# Patient Record
Sex: FEMALE | Race: WHITE | NOT HISPANIC OR LATINO | Employment: OTHER | RURAL
[De-identification: names, ages, dates, MRNs, and addresses within clinical notes are randomized per-mention and may not be internally consistent; named-entity substitution may affect disease eponyms.]

---

## 2020-03-21 ENCOUNTER — HISTORICAL (OUTPATIENT)
Dept: ADMINISTRATIVE | Facility: HOSPITAL | Age: 60
End: 2020-03-21

## 2020-10-19 ENCOUNTER — HISTORICAL (OUTPATIENT)
Dept: ADMINISTRATIVE | Facility: HOSPITAL | Age: 60
End: 2020-10-19

## 2020-10-20 LAB
ALBUMIN SERPL BCP-MCNC: 3.8 G/DL (ref 3.5–5)
ALBUMIN/GLOB SERPL: 0.9 {RATIO}
ALP SERPL-CCNC: 71 U/L (ref 50–130)
ALT SERPL W P-5'-P-CCNC: 20 U/L (ref 13–56)
ANION GAP SERPL CALCULATED.3IONS-SCNC: 9 MMOL/L (ref 7–16)
AST SERPL W P-5'-P-CCNC: 16 U/L (ref 15–37)
BASOPHILS # BLD AUTO: 0.03 X10E3/UL (ref 0–0.2)
BASOPHILS NFR BLD AUTO: 0.3 % (ref 0–1)
BILIRUB SERPL-MCNC: 0.3 MG/DL (ref 0–1.2)
BUN SERPL-MCNC: 12 MG/DL (ref 7–18)
BUN/CREAT SERPL: 10
CALCIUM SERPL-MCNC: 9.2 MG/DL (ref 8.5–10.1)
CHLORIDE SERPL-SCNC: 104 MMOL/L (ref 98–107)
CO2 SERPL-SCNC: 33 MMOL/L (ref 21–32)
CREAT SERPL-MCNC: 1.16 MG/DL (ref 0.5–1.02)
EOSINOPHIL # BLD AUTO: 0.36 X10E3/UL (ref 0–0.5)
EOSINOPHIL NFR BLD AUTO: 4 % (ref 1–4)
ERYTHROCYTE [DISTWIDTH] IN BLOOD BY AUTOMATED COUNT: 12.6 % (ref 11.5–14.5)
GLOBULIN SER-MCNC: 4.1 G/DL (ref 2–4)
GLUCOSE SERPL-MCNC: 73 MG/DL (ref 74–106)
HCT VFR BLD AUTO: 34.4 % (ref 38–47)
HGB BLD-MCNC: 11.2 G/DL (ref 12–16)
IMM GRANULOCYTES # BLD AUTO: 0.03 X10E3/UL (ref 0–0.04)
IMM GRANULOCYTES NFR BLD: 0.3 % (ref 0–0.4)
LYMPHOCYTES # BLD AUTO: 1.97 X10E3/UL (ref 1–4.8)
LYMPHOCYTES NFR BLD AUTO: 22 % (ref 27–41)
MCH RBC QN AUTO: 30.3 PG (ref 27–31)
MCHC RBC AUTO-ENTMCNC: 32.6 G/DL (ref 32–36)
MCV RBC AUTO: 93 FL (ref 80–96)
MONOCYTES # BLD AUTO: 0.71 X10E3/UL (ref 0–0.8)
MONOCYTES NFR BLD AUTO: 7.9 % (ref 2–6)
MPC BLD CALC-MCNC: 10.4 FL (ref 9.4–12.4)
NEUTROPHILS # BLD AUTO: 5.84 X10E3/UL (ref 1.8–7.7)
NEUTROPHILS NFR BLD AUTO: 65.5 % (ref 53–65)
NRBC # BLD AUTO: 0 X10E3/UL (ref 0–0)
NRBC, AUTO (.00): 0 /100 (ref 0–0)
PLATELET # BLD AUTO: 214 X10E3/UL (ref 150–400)
POTASSIUM SERPL-SCNC: 4.6 MMOL/L (ref 3.5–5.1)
PROT SERPL-MCNC: 7.9 G/DL (ref 6.4–8.2)
RBC # BLD AUTO: 3.7 X10E6/UL (ref 4.2–5.4)
SODIUM SERPL-SCNC: 141 MMOL/L (ref 136–145)
WBC # BLD AUTO: 8.94 X10E3/UL (ref 4.5–11)

## 2021-07-14 ENCOUNTER — OFFICE VISIT (OUTPATIENT)
Dept: FAMILY MEDICINE | Facility: CLINIC | Age: 61
End: 2021-07-14
Payer: COMMERCIAL

## 2021-07-14 VITALS
SYSTOLIC BLOOD PRESSURE: 133 MMHG | OXYGEN SATURATION: 97 % | BODY MASS INDEX: 49.13 KG/M2 | TEMPERATURE: 97 F | HEIGHT: 62 IN | DIASTOLIC BLOOD PRESSURE: 74 MMHG | HEART RATE: 73 BPM | WEIGHT: 267 LBS

## 2021-07-14 DIAGNOSIS — J06.9 UPPER RESPIRATORY TRACT INFECTION, UNSPECIFIED TYPE: Primary | ICD-10-CM

## 2021-07-14 DIAGNOSIS — I10 ESSENTIAL HYPERTENSION: ICD-10-CM

## 2021-07-14 PROCEDURE — 96372 PR INJECTION,THERAP/PROPH/DIAG2ST, IM OR SUBCUT: ICD-10-PCS | Mod: ,,, | Performed by: NURSE PRACTITIONER

## 2021-07-14 PROCEDURE — 99213 OFFICE O/P EST LOW 20 MIN: CPT | Mod: 25,,, | Performed by: NURSE PRACTITIONER

## 2021-07-14 PROCEDURE — 96372 THER/PROPH/DIAG INJ SC/IM: CPT | Mod: ,,, | Performed by: NURSE PRACTITIONER

## 2021-07-14 PROCEDURE — 99213 PR OFFICE/OUTPT VISIT, EST, LEVL III, 20-29 MIN: ICD-10-PCS | Mod: 25,,, | Performed by: NURSE PRACTITIONER

## 2021-07-14 RX ORDER — FOLIC ACID 1 MG/1
TABLET ORAL
COMMUNITY
End: 2022-09-19 | Stop reason: SDUPTHER

## 2021-07-14 RX ORDER — FUROSEMIDE 20 MG/1
TABLET ORAL DAILY
COMMUNITY
End: 2022-09-19 | Stop reason: SDUPTHER

## 2021-07-14 RX ORDER — CETIRIZINE HYDROCHLORIDE 10 MG/1
10 TABLET ORAL DAILY
Qty: 30 TABLET | Refills: 1 | Status: SHIPPED | OUTPATIENT
Start: 2021-07-14 | End: 2021-10-12 | Stop reason: SDUPTHER

## 2021-07-14 RX ORDER — ROSUVASTATIN CALCIUM 20 MG/1
1 TABLET, COATED ORAL NIGHTLY
COMMUNITY
End: 2021-10-12 | Stop reason: SDUPTHER

## 2021-07-14 RX ORDER — SULFASALAZINE 500 MG/1
2 TABLET ORAL 2 TIMES DAILY
COMMUNITY
End: 2022-03-18

## 2021-07-14 RX ORDER — DULOXETIN HYDROCHLORIDE 60 MG/1
CAPSULE, DELAYED RELEASE ORAL 2 TIMES DAILY
COMMUNITY
End: 2022-01-27

## 2021-07-14 RX ORDER — CETIRIZINE HYDROCHLORIDE 10 MG/1
TABLET ORAL DAILY
COMMUNITY
End: 2021-07-14 | Stop reason: SDUPTHER

## 2021-07-14 RX ORDER — OLMESARTAN MEDOXOMIL AND HYDROCHLOROTHIAZIDE 20/12.5 20; 12.5 MG/1; MG/1
TABLET ORAL
COMMUNITY
End: 2021-10-14

## 2021-07-14 RX ORDER — LEVOTHYROXINE SODIUM 125 UG/1
125 TABLET ORAL
COMMUNITY

## 2021-07-14 RX ORDER — FLUCONAZOLE 150 MG/1
150 TABLET ORAL DAILY
Qty: 2 TABLET | Refills: 0 | Status: SHIPPED | OUTPATIENT
Start: 2021-07-14 | End: 2021-07-16

## 2021-07-14 RX ORDER — AZITHROMYCIN 250 MG/1
TABLET, FILM COATED ORAL
Qty: 6 TABLET | Refills: 0 | Status: SHIPPED | OUTPATIENT
Start: 2021-07-14 | End: 2022-01-13 | Stop reason: SDUPTHER

## 2021-07-14 RX ORDER — PREDNISONE 5 MG/1
1 TABLET ORAL DAILY
COMMUNITY
Start: 2021-07-07 | End: 2022-03-18

## 2021-07-14 RX ORDER — HYDROXYCHLOROQUINE SULFATE 200 MG/1
TABLET, FILM COATED ORAL 2 TIMES DAILY
COMMUNITY
End: 2022-09-19

## 2021-07-14 RX ORDER — OXYBUTYNIN CHLORIDE 10 MG/1
1 TABLET, EXTENDED RELEASE ORAL DAILY
COMMUNITY
Start: 2021-06-08 | End: 2021-10-12 | Stop reason: SDUPTHER

## 2021-07-14 RX ORDER — CEFTRIAXONE 1 G/1
1 INJECTION, POWDER, FOR SOLUTION INTRAMUSCULAR; INTRAVENOUS
Status: COMPLETED | OUTPATIENT
Start: 2021-07-14 | End: 2021-07-14

## 2021-07-14 RX ORDER — IBUPROFEN AND FAMOTIDINE 800; 26.6 MG/1; MG/1
1 TABLET, COATED ORAL 3 TIMES DAILY PRN
COMMUNITY
Start: 2021-04-22 | End: 2022-10-31

## 2021-07-14 RX ORDER — METOPROLOL SUCCINATE 25 MG/1
TABLET, EXTENDED RELEASE ORAL
COMMUNITY
End: 2021-10-12 | Stop reason: SDUPTHER

## 2021-07-14 RX ORDER — METHOTREXATE 2.5 MG/1
TABLET ORAL
COMMUNITY

## 2021-07-14 RX ORDER — NAPROXEN SODIUM 220 MG/1
TABLET, FILM COATED ORAL
COMMUNITY

## 2021-07-14 RX ORDER — MONTELUKAST SODIUM 10 MG/1
10 TABLET ORAL NIGHTLY
Qty: 30 TABLET | Refills: 0 | Status: SHIPPED | OUTPATIENT
Start: 2021-07-14 | End: 2021-08-13

## 2021-07-14 RX ADMIN — CEFTRIAXONE 1 G: 1 INJECTION, POWDER, FOR SOLUTION INTRAMUSCULAR; INTRAVENOUS at 11:07

## 2021-10-12 ENCOUNTER — OFFICE VISIT (OUTPATIENT)
Dept: FAMILY MEDICINE | Facility: CLINIC | Age: 61
End: 2021-10-12
Payer: COMMERCIAL

## 2021-10-12 VITALS
HEART RATE: 68 BPM | SYSTOLIC BLOOD PRESSURE: 122 MMHG | HEIGHT: 62 IN | BODY MASS INDEX: 42.76 KG/M2 | TEMPERATURE: 98 F | DIASTOLIC BLOOD PRESSURE: 79 MMHG | WEIGHT: 232.38 LBS

## 2021-10-12 DIAGNOSIS — I10 HYPERTENSION, UNSPECIFIED TYPE: ICD-10-CM

## 2021-10-12 DIAGNOSIS — R11.0 NAUSEA: Primary | ICD-10-CM

## 2021-10-12 DIAGNOSIS — E03.9 HYPOTHYROIDISM, UNSPECIFIED TYPE: ICD-10-CM

## 2021-10-12 LAB
ALBUMIN SERPL BCP-MCNC: 3.7 G/DL (ref 3.5–5)
ALBUMIN/GLOB SERPL: 1 {RATIO}
ALP SERPL-CCNC: 69 U/L (ref 50–130)
ALT SERPL W P-5'-P-CCNC: 39 U/L (ref 13–56)
ANION GAP SERPL CALCULATED.3IONS-SCNC: 10 MMOL/L (ref 7–16)
AST SERPL W P-5'-P-CCNC: 19 U/L (ref 15–37)
BASOPHILS # BLD AUTO: 0.02 K/UL (ref 0–0.2)
BASOPHILS NFR BLD AUTO: 0.3 % (ref 0–1)
BILIRUB SERPL-MCNC: 0.4 MG/DL (ref 0–1.2)
BILIRUB UR QL STRIP: ABNORMAL
BUN SERPL-MCNC: 6 MG/DL (ref 7–18)
BUN/CREAT SERPL: 6 (ref 6–20)
CALCIUM SERPL-MCNC: 9.6 MG/DL (ref 8.5–10.1)
CHLORIDE SERPL-SCNC: 104 MMOL/L (ref 98–107)
CHOLEST SERPL-MCNC: 177 MG/DL (ref 0–200)
CHOLEST/HDLC SERPL: 3.7 {RATIO}
CLARITY UR: ABNORMAL
CO2 SERPL-SCNC: 29 MMOL/L (ref 21–32)
COLOR UR: YELLOW
CREAT SERPL-MCNC: 1.03 MG/DL (ref 0.55–1.02)
DIFFERENTIAL METHOD BLD: ABNORMAL
EOSINOPHIL # BLD AUTO: 0.15 K/UL (ref 0–0.5)
EOSINOPHIL NFR BLD AUTO: 2 % (ref 1–4)
ERYTHROCYTE [DISTWIDTH] IN BLOOD BY AUTOMATED COUNT: 13.7 % (ref 11.5–14.5)
GLOBULIN SER-MCNC: 3.8 G/DL (ref 2–4)
GLUCOSE SERPL-MCNC: 79 MG/DL (ref 74–106)
GLUCOSE UR STRIP-MCNC: NEGATIVE MG/DL
HCT VFR BLD AUTO: 36.2 % (ref 38–47)
HDLC SERPL-MCNC: 48 MG/DL (ref 40–60)
HGB BLD-MCNC: 12.1 G/DL (ref 12–16)
IMM GRANULOCYTES # BLD AUTO: 0.02 K/UL (ref 0–0.04)
IMM GRANULOCYTES NFR BLD: 0.3 % (ref 0–0.4)
KETONES UR STRIP-SCNC: ABNORMAL MG/DL
LDLC SERPL CALC-MCNC: 105 MG/DL
LDLC/HDLC SERPL: 2.2 {RATIO}
LEUKOCYTE ESTERASE UR QL STRIP: NEGATIVE
LYMPHOCYTES # BLD AUTO: 1.35 K/UL (ref 1–4.8)
LYMPHOCYTES NFR BLD AUTO: 18 % (ref 27–41)
MCH RBC QN AUTO: 31.9 PG (ref 27–31)
MCHC RBC AUTO-ENTMCNC: 33.4 G/DL (ref 32–36)
MCV RBC AUTO: 95.5 FL (ref 80–96)
MONOCYTES # BLD AUTO: 0.57 K/UL (ref 0–0.8)
MONOCYTES NFR BLD AUTO: 7.6 % (ref 2–6)
MPC BLD CALC-MCNC: 10.6 FL (ref 9.4–12.4)
NEUTROPHILS # BLD AUTO: 5.37 K/UL (ref 1.8–7.7)
NEUTROPHILS NFR BLD AUTO: 71.8 % (ref 53–65)
NITRITE UR QL STRIP: NEGATIVE
NONHDLC SERPL-MCNC: 129 MG/DL
NRBC # BLD AUTO: 0 X10E3/UL
NRBC, AUTO (.00): 0 %
PH UR STRIP: 6 PH UNITS
PLATELET # BLD AUTO: 255 K/UL (ref 150–400)
POTASSIUM SERPL-SCNC: 3.7 MMOL/L (ref 3.5–5.1)
PROT SERPL-MCNC: 7.5 G/DL (ref 6.4–8.2)
PROT UR QL STRIP: ABNORMAL
RBC # BLD AUTO: 3.79 M/UL (ref 4.2–5.4)
RBC # UR STRIP: NEGATIVE /UL
SODIUM SERPL-SCNC: 139 MMOL/L (ref 136–145)
SP GR UR STRIP: >=1.03
T4 SERPL-MCNC: 8.9 ΜG/DL (ref 4.8–13.9)
TRIGL SERPL-MCNC: 122 MG/DL (ref 35–150)
TSH SERPL DL<=0.005 MIU/L-ACNC: 3.31 UIU/ML (ref 0.36–3.74)
UROBILINOGEN UR STRIP-ACNC: 0.2 MG/DL
VLDLC SERPL-MCNC: 24 MG/DL
WBC # BLD AUTO: 7.48 K/UL (ref 4.5–11)

## 2021-10-12 PROCEDURE — 81003 URINALYSIS AUTO W/O SCOPE: CPT | Mod: QW,,, | Performed by: CLINICAL MEDICAL LABORATORY

## 2021-10-12 PROCEDURE — 80050 PR  GENERAL HEALTH PANEL: ICD-10-PCS | Mod: ,,, | Performed by: CLINICAL MEDICAL LABORATORY

## 2021-10-12 PROCEDURE — 99213 OFFICE O/P EST LOW 20 MIN: CPT | Mod: ,,, | Performed by: NURSE PRACTITIONER

## 2021-10-12 PROCEDURE — 80061 LIPID PANEL: ICD-10-PCS | Mod: ,,, | Performed by: CLINICAL MEDICAL LABORATORY

## 2021-10-12 PROCEDURE — 84436 T4: ICD-10-PCS | Mod: ,,, | Performed by: CLINICAL MEDICAL LABORATORY

## 2021-10-12 PROCEDURE — 80050 GENERAL HEALTH PANEL: CPT | Mod: ,,, | Performed by: CLINICAL MEDICAL LABORATORY

## 2021-10-12 PROCEDURE — 81003 URINALYSIS: ICD-10-PCS | Mod: QW,,, | Performed by: CLINICAL MEDICAL LABORATORY

## 2021-10-12 PROCEDURE — 80061 LIPID PANEL: CPT | Mod: ,,, | Performed by: CLINICAL MEDICAL LABORATORY

## 2021-10-12 PROCEDURE — 84436 ASSAY OF TOTAL THYROXINE: CPT | Mod: ,,, | Performed by: CLINICAL MEDICAL LABORATORY

## 2021-10-12 PROCEDURE — 99213 PR OFFICE/OUTPT VISIT, EST, LEVL III, 20-29 MIN: ICD-10-PCS | Mod: ,,, | Performed by: NURSE PRACTITIONER

## 2021-10-12 RX ORDER — CETIRIZINE HYDROCHLORIDE 10 MG/1
10 TABLET ORAL DAILY
Qty: 30 TABLET | Refills: 1 | Status: SHIPPED | OUTPATIENT
Start: 2021-10-12 | End: 2022-01-13 | Stop reason: SDUPTHER

## 2021-10-12 RX ORDER — METOPROLOL TARTRATE 25 MG/1
TABLET, FILM COATED ORAL
Qty: 180 TABLET | Refills: 1 | Status: SHIPPED | OUTPATIENT
Start: 2021-10-12 | End: 2022-03-30 | Stop reason: SDUPTHER

## 2021-10-12 RX ORDER — ROSUVASTATIN CALCIUM 20 MG/1
20 TABLET, COATED ORAL NIGHTLY
Qty: 90 TABLET | Refills: 1 | Status: SHIPPED | OUTPATIENT
Start: 2021-10-12 | End: 2022-03-30 | Stop reason: SDUPTHER

## 2021-10-12 RX ORDER — PANTOPRAZOLE SODIUM 40 MG/1
40 TABLET, DELAYED RELEASE ORAL DAILY
Qty: 30 TABLET | Refills: 11 | Status: SHIPPED | OUTPATIENT
Start: 2021-10-12 | End: 2022-09-19

## 2021-10-12 RX ORDER — OXYBUTYNIN CHLORIDE 10 MG/1
10 TABLET, EXTENDED RELEASE ORAL DAILY
Qty: 90 TABLET | Refills: 1 | Status: SHIPPED | OUTPATIENT
Start: 2021-10-12 | End: 2022-09-19 | Stop reason: SDUPTHER

## 2021-10-26 DIAGNOSIS — N39.0 URINARY TRACT INFECTION WITHOUT HEMATURIA, SITE UNSPECIFIED: Primary | ICD-10-CM

## 2021-10-28 RX ORDER — CIPROFLOXACIN 500 MG/1
500 TABLET ORAL EVERY 12 HOURS
Qty: 14 TABLET | Refills: 0 | Status: SHIPPED | OUTPATIENT
Start: 2021-10-28 | End: 2021-11-04

## 2022-01-13 ENCOUNTER — OFFICE VISIT (OUTPATIENT)
Dept: FAMILY MEDICINE | Facility: CLINIC | Age: 62
End: 2022-01-13
Payer: COMMERCIAL

## 2022-01-13 VITALS
OXYGEN SATURATION: 97 % | RESPIRATION RATE: 18 BRPM | DIASTOLIC BLOOD PRESSURE: 68 MMHG | HEIGHT: 62 IN | TEMPERATURE: 97 F | BODY MASS INDEX: 38.83 KG/M2 | SYSTOLIC BLOOD PRESSURE: 98 MMHG | WEIGHT: 211 LBS | HEART RATE: 78 BPM

## 2022-01-13 DIAGNOSIS — J06.9 ACUTE UPPER RESPIRATORY INFECTION: ICD-10-CM

## 2022-01-13 DIAGNOSIS — J32.9 SINUSITIS, UNSPECIFIED CHRONICITY, UNSPECIFIED LOCATION: Primary | ICD-10-CM

## 2022-01-13 DIAGNOSIS — I10 PRIMARY HYPERTENSION: ICD-10-CM

## 2022-01-13 LAB
CTP QC/QA: YES
CTP QC/QA: YES
FLUAV AG NPH QL: NEGATIVE
FLUBV AG NPH QL: NEGATIVE
SARS-COV-2 AG RESP QL IA.RAPID: NEGATIVE

## 2022-01-13 PROCEDURE — 3074F PR MOST RECENT SYSTOLIC BLOOD PRESSURE < 130 MM HG: ICD-10-PCS | Mod: ,,, | Performed by: NURSE PRACTITIONER

## 2022-01-13 PROCEDURE — 87804 POCT INFLUENZA A/B: ICD-10-PCS | Mod: 59,QW,, | Performed by: NURSE PRACTITIONER

## 2022-01-13 PROCEDURE — 1159F MED LIST DOCD IN RCRD: CPT | Mod: ,,, | Performed by: NURSE PRACTITIONER

## 2022-01-13 PROCEDURE — 3078F PR MOST RECENT DIASTOLIC BLOOD PRESSURE < 80 MM HG: ICD-10-PCS | Mod: ,,, | Performed by: NURSE PRACTITIONER

## 2022-01-13 PROCEDURE — 3008F BODY MASS INDEX DOCD: CPT | Mod: ,,, | Performed by: NURSE PRACTITIONER

## 2022-01-13 PROCEDURE — 99213 OFFICE O/P EST LOW 20 MIN: CPT | Mod: ,,, | Performed by: NURSE PRACTITIONER

## 2022-01-13 PROCEDURE — 99213 PR OFFICE/OUTPT VISIT, EST, LEVL III, 20-29 MIN: ICD-10-PCS | Mod: ,,, | Performed by: NURSE PRACTITIONER

## 2022-01-13 PROCEDURE — 3074F SYST BP LT 130 MM HG: CPT | Mod: ,,, | Performed by: NURSE PRACTITIONER

## 2022-01-13 PROCEDURE — 3078F DIAST BP <80 MM HG: CPT | Mod: ,,, | Performed by: NURSE PRACTITIONER

## 2022-01-13 PROCEDURE — 1159F PR MEDICATION LIST DOCUMENTED IN MEDICAL RECORD: ICD-10-PCS | Mod: ,,, | Performed by: NURSE PRACTITIONER

## 2022-01-13 PROCEDURE — 3008F PR BODY MASS INDEX (BMI) DOCUMENTED: ICD-10-PCS | Mod: ,,, | Performed by: NURSE PRACTITIONER

## 2022-01-13 PROCEDURE — 87804 INFLUENZA ASSAY W/OPTIC: CPT | Mod: QW,,, | Performed by: NURSE PRACTITIONER

## 2022-01-13 PROCEDURE — 87426 SARS CORONAVIRUS 2 ANTIGEN POCT: ICD-10-PCS | Mod: QW,,, | Performed by: NURSE PRACTITIONER

## 2022-01-13 PROCEDURE — 87426 SARSCOV CORONAVIRUS AG IA: CPT | Mod: QW,,, | Performed by: NURSE PRACTITIONER

## 2022-01-13 RX ORDER — AZITHROMYCIN 250 MG/1
TABLET, FILM COATED ORAL
Qty: 6 TABLET | Refills: 0 | Status: SHIPPED | OUTPATIENT
Start: 2022-01-13 | End: 2022-01-27

## 2022-01-13 RX ORDER — CETIRIZINE HYDROCHLORIDE 10 MG/1
10 TABLET ORAL DAILY
Qty: 30 TABLET | Refills: 1 | Status: SHIPPED | OUTPATIENT
Start: 2022-01-13 | End: 2022-09-19

## 2022-01-13 NOTE — PROGRESS NOTES
New Clinic Note    Marielle Durham is a 61 y.o. female presents to the clinic with c/o coughing and congestion, started two days ago.  No fever    Chief complaints    Chief Complaint   Patient presents with    Cough    Sinus Problem    Sinusitis    congestion    Nasal Congestion        Subjective:    HPI       Current Outpatient Medications:     aspirin 81 MG Chew, 1 tablet, Disp: , Rfl:     azithromycin (ZITHROMAX Z-MALLORY) 250 MG tablet, As directed, Disp: 6 tablet, Rfl: 0    cetirizine (ZYRTEC) 10 MG tablet, Take 1 tablet (10 mg total) by mouth once daily., Disp: 30 tablet, Rfl: 1    DUEXIS 800-26.6 mg Tab, Take 1 tablet by mouth 3 (three) times daily as needed., Disp: , Rfl:     DULoxetine (CYMBALTA) 60 MG capsule, TAKE ONE (1) CAPSULE BY MOUTH TWICE DAILY, Disp: 60 capsule, Rfl: 5    DULoxetine (CYMBALTA) 60 MG capsule, 2 (two) times daily., Disp: , Rfl:     folic acid (FOLVITE) 1 MG tablet, 1 tablet, Disp: , Rfl:     furosemide (LASIX) 20 MG tablet, once daily., Disp: , Rfl:     hydrOXYchloroQUINE (PLAQUENIL) 200 mg tablet, 2 (two) times daily., Disp: , Rfl:     levothyroxine (SYNTHROID) 112 MCG tablet, 1 tablet, Disp: , Rfl:     methotrexate 2.5 MG Tab, 8 tablets, Disp: , Rfl:     metoprolol tartrate (LOPRESSOR) 25 MG tablet, TAKE ONE (1) TABLET BY MOUTH EACH MORNING AND EACH EVENING, Disp: 180 tablet, Rfl: 1    olmesartan-hydrochlorothiazide (BENICAR HCT) 20-12.5 mg per tablet, TAKE ONE TABLET BY MOUTH DAILY FOR BLOOD PRESSURE, Disp: 90 tablet, Rfl: 0    oxybutynin (DITROPAN-XL) 10 MG 24 hr tablet, Take 1 tablet (10 mg total) by mouth once daily., Disp: 90 tablet, Rfl: 1    pantoprazole (PROTONIX) 40 MG tablet, Take 1 tablet (40 mg total) by mouth once daily., Disp: 30 tablet, Rfl: 11    predniSONE (DELTASONE) 5 MG tablet, Take 1 tablet by mouth once daily., Disp: , Rfl:     rosuvastatin (CRESTOR) 20 MG tablet, Take 1 tablet (20 mg total) by mouth every evening., Disp: 90 tablet, Rfl:  "1    sulfaSALAzine (AZULFIDINE) 500 mg Tab, Take 2 tablets by mouth 2 (two) times daily., Disp: , Rfl:      Past Surgical History:   Procedure Laterality Date     SECTION      CHOLECYSTECTOMY      HYSTERECTOMY      TUBAL LIGATION          Social History     Socioeconomic History    Marital status:      Spouse name: esteban    Number of children: 2   Occupational History    Occupation: retired   Tobacco Use    Smoking status: Never Smoker    Smokeless tobacco: Never Used   Substance and Sexual Activity    Alcohol use: Never    Drug use: Never    Sexual activity: Yes     Partners: Male     Birth control/protection: None        Review of Systems   Constitutional: Negative.  Negative for fatigue and fever.   HENT: Positive for nasal congestion, rhinorrhea, sinus pressure/congestion and sneezing.    Respiratory: Positive for cough.    Cardiovascular: Negative.    Gastrointestinal: Negative.    Endocrine: Negative.    Musculoskeletal: Negative.    Neurological: Negative.    Psychiatric/Behavioral: Negative.    All other systems reviewed and are negative.       Objective:  BP 98/68   Pulse 78   Temp 97.1 °F (36.2 °C)   Resp 18   Ht 5' 2" (1.575 m)   Wt 95.7 kg (211 lb)   SpO2 97%   BMI 38.59 kg/m²      Physical Exam  Constitutional:       Appearance: Normal appearance.   HENT:      Right Ear: Tympanic membrane and ear canal normal.      Left Ear: Tympanic membrane and ear canal normal.      Nose: Congestion and rhinorrhea present.      Mouth/Throat:      Mouth: Mucous membranes are moist.   Eyes:      Pupils: Pupils are equal, round, and reactive to light.   Cardiovascular:      Rate and Rhythm: Normal rate and regular rhythm.      Pulses: Normal pulses.      Heart sounds: Normal heart sounds.   Pulmonary:      Effort: Pulmonary effort is normal.      Breath sounds: Normal breath sounds.   Musculoskeletal:         General: Normal range of motion.      Cervical back: Normal range of motion. "   Skin:     General: Skin is warm and dry.   Neurological:      General: No focal deficit present.      Mental Status: She is alert and oriented to person, place, and time.   Psychiatric:         Mood and Affect: Mood normal.         Behavior: Behavior normal.          Assessment and Plan:  1. Sinusitis, unspecified chronicity, unspecified location    2. Acute upper respiratory infection    3. Primary hypertension         Sinusitis, unspecified chronicity, unspecified location  -     azithromycin (ZITHROMAX Z-MALLORY) 250 MG tablet; As directed  Dispense: 6 tablet; Refill: 0  -     cetirizine (ZYRTEC) 10 MG tablet; Take 1 tablet (10 mg total) by mouth once daily.  Dispense: 30 tablet; Refill: 1    Acute upper respiratory infection  -     POCT Influenza A/B  -     SARS Coronavirus 2 Antigen, POCT  -     azithromycin (ZITHROMAX Z-MALLORY) 250 MG tablet; As directed  Dispense: 6 tablet; Refill: 0  -     cetirizine (ZYRTEC) 10 MG tablet; Take 1 tablet (10 mg total) by mouth once daily.  Dispense: 30 tablet; Refill: 1    Primary hypertension         Active Problem List with Overview Notes    Diagnosis Date Noted    Hypertension     Thyroid disease     Hyperlipidemia     Arthritis         Follow up in about 2 weeks (around 1/27/2022) for discuss bp with Luis.       Patient Instructions   1. Drink fluids  2. Tylenol as needs  3. Follow up in two weeks with Luis to eval bp.  She has lost 50 pounds with dieting and meds and may need adjustment of bp meds.

## 2022-01-21 ENCOUNTER — TELEPHONE (OUTPATIENT)
Dept: FAMILY MEDICINE | Facility: CLINIC | Age: 62
End: 2022-01-21
Payer: COMMERCIAL

## 2022-01-21 DIAGNOSIS — K29.00 ACUTE GASTRITIS WITHOUT HEMORRHAGE, UNSPECIFIED GASTRITIS TYPE: Primary | ICD-10-CM

## 2022-01-21 PROBLEM — M54.16 LUMBAR RADICULOPATHY: Status: ACTIVE | Noted: 2022-01-21

## 2022-01-21 PROBLEM — M43.10 ACQUIRED SPONDYLOLISTHESIS: Status: ACTIVE | Noted: 2022-01-21

## 2022-01-21 PROBLEM — E03.9 ACQUIRED HYPOTHYROIDISM: Status: ACTIVE | Noted: 2022-01-21

## 2022-01-21 PROBLEM — D64.9 ANEMIA: Status: ACTIVE | Noted: 2022-01-21

## 2022-01-21 PROBLEM — M79.7 FIBROMYALGIA: Status: ACTIVE | Noted: 2022-01-21

## 2022-01-21 PROBLEM — M15.9 GENERALIZED OSTEOARTHRITIS: Status: ACTIVE | Noted: 2022-01-21

## 2022-01-21 RX ORDER — ONDANSETRON 4 MG/1
4 TABLET, FILM COATED ORAL EVERY 8 HOURS PRN
Qty: 15 TABLET | Refills: 0 | Status: SHIPPED | OUTPATIENT
Start: 2022-01-21 | End: 2022-01-24

## 2022-01-21 NOTE — TELEPHONE ENCOUNTER
Patient called reporting she has had nausea and vomiting for the past 1-2 days, symptoms have gotten worse today. She states it could be a virus or it could be a diet plan she has been following. She is requesting zofran to help with the vomiting. Instructed patient to go to a clear liquid diet for the remainder of today, slowly progress to full liquids tomorrow morning and progress as follows. Avoid all spicy and fried foods for the next 1-2 days keeping diet bland. If the vomiting continues or if you begin having severe abdominal pain, go to the ER in [place] for evaluation by a GI doctor. Patient verbalized an understanding.

## 2022-01-27 ENCOUNTER — OFFICE VISIT (OUTPATIENT)
Dept: FAMILY MEDICINE | Facility: CLINIC | Age: 62
End: 2022-01-27
Payer: COMMERCIAL

## 2022-01-27 VITALS
DIASTOLIC BLOOD PRESSURE: 81 MMHG | SYSTOLIC BLOOD PRESSURE: 124 MMHG | OXYGEN SATURATION: 100 % | BODY MASS INDEX: 39.78 KG/M2 | HEIGHT: 62 IN | HEART RATE: 69 BPM | WEIGHT: 216.19 LBS

## 2022-01-27 DIAGNOSIS — I10 PRIMARY HYPERTENSION: Primary | ICD-10-CM

## 2022-01-27 PROBLEM — M94.0 TIETZE'S DISEASE: Status: ACTIVE | Noted: 2022-01-27

## 2022-01-27 PROCEDURE — 3079F DIAST BP 80-89 MM HG: CPT | Mod: ,,, | Performed by: REGISTERED NURSE

## 2022-01-27 PROCEDURE — 1159F PR MEDICATION LIST DOCUMENTED IN MEDICAL RECORD: ICD-10-PCS | Mod: ,,, | Performed by: REGISTERED NURSE

## 2022-01-27 PROCEDURE — 1160F RVW MEDS BY RX/DR IN RCRD: CPT | Mod: ,,, | Performed by: REGISTERED NURSE

## 2022-01-27 PROCEDURE — 3008F PR BODY MASS INDEX (BMI) DOCUMENTED: ICD-10-PCS | Mod: ,,, | Performed by: REGISTERED NURSE

## 2022-01-27 PROCEDURE — 3079F PR MOST RECENT DIASTOLIC BLOOD PRESSURE 80-89 MM HG: ICD-10-PCS | Mod: ,,, | Performed by: REGISTERED NURSE

## 2022-01-27 PROCEDURE — 3074F PR MOST RECENT SYSTOLIC BLOOD PRESSURE < 130 MM HG: ICD-10-PCS | Mod: ,,, | Performed by: REGISTERED NURSE

## 2022-01-27 PROCEDURE — 99213 OFFICE O/P EST LOW 20 MIN: CPT | Mod: ,,, | Performed by: REGISTERED NURSE

## 2022-01-27 PROCEDURE — 99213 PR OFFICE/OUTPT VISIT, EST, LEVL III, 20-29 MIN: ICD-10-PCS | Mod: ,,, | Performed by: REGISTERED NURSE

## 2022-01-27 PROCEDURE — 1160F PR REVIEW ALL MEDS BY PRESCRIBER/CLIN PHARMACIST DOCUMENTED: ICD-10-PCS | Mod: ,,, | Performed by: REGISTERED NURSE

## 2022-01-27 PROCEDURE — 3008F BODY MASS INDEX DOCD: CPT | Mod: ,,, | Performed by: REGISTERED NURSE

## 2022-01-27 PROCEDURE — 3074F SYST BP LT 130 MM HG: CPT | Mod: ,,, | Performed by: REGISTERED NURSE

## 2022-01-27 PROCEDURE — 1159F MED LIST DOCD IN RCRD: CPT | Mod: ,,, | Performed by: REGISTERED NURSE

## 2022-01-27 RX ORDER — METOPROLOL SUCCINATE 25 MG/1
TABLET, EXTENDED RELEASE ORAL
COMMUNITY
End: 2022-09-19 | Stop reason: SDUPTHER

## 2022-01-27 NOTE — PROGRESS NOTES
ROD Perez        PATIENT NAME: Marielle Durham  : 1960  DATE: 22  MRN: [id number]      Billing Provider: ROD Perez  Level of Service:   Patient PCP Information     Provider PCP Type    ROD Perez General          Reason for Visit / Chief Complaint: Follow-up and Hypertension       Update PCP  Update Chief Complaint         History of Present Illness / Problem Focused Workflow     Marielle Durham presents to the clinic with Follow-up and Hypertension     HPI  60 y/o WF had a wellness done with Dr. Cote on 2022. She had a mammogram and lab work. Patients blood pressure has been running 112/78, 120/80, 132/78. Blood pressure improved from last encounter.     Review of Systems     Review of Systems   Constitutional: Negative.    HENT: Negative.    Respiratory: Negative.    Cardiovascular: Negative.    Musculoskeletal: Positive for back pain.   Integumentary:  Negative.   Neurological: Negative.    Psychiatric/Behavioral: Negative.         Medical / Social / Family History     Past Medical History:   Diagnosis Date    Allergy     Arthritis     Hyperlipidemia     Hypertension     Thyroid disease        Past Surgical History:   Procedure Laterality Date     SECTION      CHOLECYSTECTOMY      HYSTERECTOMY      TUBAL LIGATION         Social History  Ms. Marielle Durham  reports that she has never smoked. She has never used smokeless tobacco. She reports that she does not drink alcohol and does not use drugs.    Family History  Ms. Marielle Durham's family history is not on file.    Medications and Allergies     Medications  Outpatient Medications Marked as Taking for the 22 encounter (Office Visit) with ROD Perez   Medication Sig Dispense Refill    aspirin 81 MG Chew 1 tablet      cetirizine (ZYRTEC) 10 MG tablet Take 1 tablet (10 mg total) by mouth once daily. 30 tablet 1    DUEXIS 800-26.6 mg Tab Take 1 tablet by  mouth 3 (three) times daily as needed.      DULoxetine (CYMBALTA) 60 MG capsule TAKE ONE (1) CAPSULE BY MOUTH TWICE DAILY 60 capsule 5    folic acid (FOLVITE) 1 MG tablet 1 tablet      furosemide (LASIX) 20 MG tablet once daily.      hydrOXYchloroQUINE (PLAQUENIL) 200 mg tablet 2 (two) times daily.      levothyroxine (SYNTHROID) 112 MCG tablet 1 tablet      methotrexate 2.5 MG Tab 8 tablets      metoprolol succinate (TOPROL-XL) 25 MG 24 hr tablet 1 tablet      metoprolol tartrate (LOPRESSOR) 25 MG tablet TAKE ONE (1) TABLET BY MOUTH EACH MORNING AND EACH EVENING 180 tablet 1    olmesartan-hydrochlorothiazide (BENICAR HCT) 20-12.5 mg per tablet TAKE ONE TABLET BY MOUTH DAILY FOR BLOOD PRESSURE 90 tablet 0    pantoprazole (PROTONIX) 40 MG tablet Take 1 tablet (40 mg total) by mouth once daily. 30 tablet 11    predniSONE (DELTASONE) 5 MG tablet Take 1 tablet by mouth once daily.      sulfaSALAzine (AZULFIDINE) 500 mg Tab Take 2 tablets by mouth 2 (two) times daily.         Allergies  Review of patient's allergies indicates:   Allergen Reactions    Pentazocine lactate Rash       Physical Examination     Vitals:    01/27/22 1408   BP: 124/81   Pulse: 69     Physical Exam  Vitals and nursing note reviewed.   Constitutional:       Appearance: She is obese.      Comments: Patient is working on weight loss   HENT:      Head: Normocephalic and atraumatic.   Cardiovascular:      Rate and Rhythm: Normal rate and regular rhythm.      Heart sounds: Normal heart sounds.   Pulmonary:      Effort: Pulmonary effort is normal.      Breath sounds: Normal breath sounds.   Musculoskeletal:         General: Normal range of motion.      Cervical back: Normal range of motion.   Skin:     General: Skin is warm and dry.   Neurological:      Mental Status: She is alert and oriented to person, place, and time.   Psychiatric:         Mood and Affect: Mood normal.         Behavior: Behavior normal.          Assessment and Plan  (including Health Maintenance)      Problem List  Smart Sets  Document Outside HM   :    Plan:   There are no diagnoses linked to this encounter.       Health Maintenance Due   Topic Date Due    Hepatitis C Screening  Never done    COVID-19 Vaccine (1) Never done    HIV Screening  Never done    TETANUS VACCINE  Never done    Mammogram  Never done    Colorectal Cancer Screening  Never done    Shingles Vaccine (1 of 2) Never done    Influenza Vaccine (1) Never done       Problem List Items Addressed This Visit    None         Health Maintenance Topics with due status: Not Due       Topic Last Completion Date    Lipid Panel 10/12/2021       No future appointments.     Patient Instructions   Patient will continue to monitor blood pressure and will keep a bp log. If blood pressure is consistently higher than 140/80, she will return to clinic for evaluation.     Patient to continue her weight loss efforts as this will help improve her htn management.    Patient Education       DASH Diet   About this topic   DASH stands for Dietary Approaches to Stop Hypertension. The DASH diet may help you lower blood pressure. It may also help keep you from getting high blood pressure. You will eat less fat and more fiber on the DASH diet.  This diet gives you more minerals that fight high blood pressure. Some nutrients in this diet are:  · Potassium ? Acts to help you get rid of salt. This may help to lower blood pressure.  · Calcium ? Makes blood vessels and muscles work the right way  · Magnesium - Helps blood vessels relax  · Fiber ? Helps you feel full. It also helps digestion.  What will the results be?   The DASH diet may help you:  · Lower your blood pressure and cholesterol  · Lower your risk for cancer, heart disease, heart attack, and stroke. It may also lower your risk for heart failure, kidney stones, and diabetes.  · Lose weight or keep a healthy weight  What lifestyle changes are needed?   · Add regular exercise to  get the most help from this diet.  · Try to lower stress. Find ways to relax.  · Stop smoking. Avoid secondhand smoke.  · Limit alcohol intake.  What changes to diet are needed?   · Know about poor eating habits. Then, you can fix them as you work with the program.  · This diet encourages fruits and vegetables, whole grains, lean meats, healthy fats, and low-fat or fat-free dairy products.  · This diet is lower in saturated fats, trans-fats, cholesterol, added sugars, and sodium.  Who should use this diet?   This eating plan is good for the whole family. It is also good for people with high blood pressure and those at risk for high blood pressure.  What foods are good to eat?   · Grains: Try to eat 6 to 8 servings of whole grain, high fiber foods each day. These are bread, cereals, brown rice, or pasta.  · Fruits and vegetables: Eat 4 to 5 servings each day. Try to pick many kinds and colors. Fresh or frozen are best. Look for low sodium or salt-free if you choose canned.  · Dairy: Try to eat 2 to 3 servings of fat free and low fat milk products each day.  · Lean meats, poultry, and seafood: Try to eat 6 servings or less of lean meats, poultry, and seafood each day. Try to choose more low fat or lean meats like chicken and turkey. Eat less red meat. Eat more fish instead.  · Nuts, seeds, and legumes (dry beans and peas): Try to eat 4 to 5 servings each week. Try to pick nuts such as almonds and walnuts, sunflower seeds, peanut butter, soy beans, lentils, kidney beans, and split peas.  · Fats and oils: Try to eat 2 to 3 servings of fats and oils each day. Eat good fats found in fish, nuts, and avocados. Try using olive oil or vegetable oils such as canola oil. Other good oils to try are corn, safflower, sunflower, or soybean oils. Use low-sodium and low-fat salad dressing and mayonnaise.  · Condiments: Pepper, herbs, spices, vinegar, lemon or lime juices are great for seasoning. Be careful to choose low-sodium or  salt-free products if you use broths, soups, or soy sauce.  · Sweets: Try to eat less than 5 servings each week. Choose low-fat and trans fat-free desserts. These are things like fruit flavored gelatin, sorbet, jelly beans, morgan crackers, animal crackers, low-fat fig bars, and tani snaps. Eat fruit to satisfy your desire for sweets.     What foods should be limited or avoided?   · Grains: Salted breads, rolls, crackers, quick breads, self-rising flours, biscuit mixes, regular bread crumbs, instant hot cereals, commercially-prepared rice, pasta, stuffing mixes  · Fruits and vegetables: Commercially-prepared potatoes and vegetable mixes, regular canned vegetables and juices, vegetables frozen with sauce or pickled vegetables, processed fruits with salt or sodium  · Milk: Whole milk, malted milk, chocolate milk, buttermilk, cheese, ice cream  · Meats and beans: Smoked, cured, salted, or canned fish; meats or poultry such as barnes, sausages, sardines; high-fat cuts of meat like beef, lamb, or pork; chicken with the skin on it  · Fats: Cut back on solid fats like butter, lard, and margarine. Eat less food with high saturated fat, cholesterol and total fat.  · Condiments and snacks: Salted and canned peas, beans, and olives; salted snack foods; fried foods; soda or other sweetened drinks  · Sweets: High-fat baked goods such as muffins, donuts, pastries, commercial baked goods, candy bars  · If you choose to drink alcohol, limit the amount you drink. Women should have 1 drink or less per day and men should have 2 drinks or less per day.  Helpful tips   · Avoid eating canned vegetables and processed foods. These have a lot of salt in them. Look for a low-salt or low-sodium choice.  · Try baking or broiling instead of frying food.  · Write down the foods you eat. This will help you track what you have eaten each week.  · When you go to a grocery store, have a list or a meal plan. Do not shop when you are hungry to avoid  cravings for foods.  · Read food labels with care. They will show you how much is in a serving. The amount is given as a percentage of the total amount you need each day. Reading labels will help you make healthy food choices.       · Avoid fast foods.  · Talk to your doctor or dietitian to see if you need vitamin and mineral supplements to help you balance your diet.  · Talk to a dietitian for help.  Where can I learn more?   [employer]  https://www.eatright.org/health/wellness/heart-and-cardiovascular-health/dash-diet-reducing-hypertension-through-diet-and-lifestyle   [employer].org  http://familydoctor.org/familydoctor/en/prevention-wellness/food-nutrition/weight-loss/the-dash-diet-healthy-eating-to-control-your-blood-pressure.html   Last Reviewed Date   2021-03-15  Consumer Information Use and Disclaimer   This information is not specific medical advice and does not replace information you receive from your health care provider. This is only a brief summary of general information. It does NOT include all information about conditions, illnesses, injuries, tests, procedures, treatments, therapies, discharge instructions or life-style choices that may apply to you. You must talk with your health care provider for complete information about your health and treatment options. This information should not be used to decide whether or not to accept your health care providers advice, instructions or recommendations. Only your health care provider has the knowledge and training to provide advice that is right for you.  Copyright   Copyright © 2021 [employer] and its affiliates and/or licensors. All rights reserved.      Follow up in about 3 months (around 4/27/2022) for HTN, DM.     Signature:  ROD Perez      Date of encounter: 1/27/22

## 2022-02-03 NOTE — PATIENT INSTRUCTIONS
Patient will continue to monitor blood pressure and will keep a bp log. If blood pressure is consistently higher than 140/80, she will return to clinic for evaluation.     Patient to continue her weight loss efforts as this will help improve her htn management.    Patient Education       DASH Diet   About this topic   DASH stands for Dietary Approaches to Stop Hypertension. The DASH diet may help you lower blood pressure. It may also help keep you from getting high blood pressure. You will eat less fat and more fiber on the DASH diet.  This diet gives you more minerals that fight high blood pressure. Some nutrients in this diet are:  · Potassium ? Acts to help you get rid of salt. This may help to lower blood pressure.  · Calcium ? Makes blood vessels and muscles work the right way  · Magnesium - Helps blood vessels relax  · Fiber ? Helps you feel full. It also helps digestion.  What will the results be?   The DASH diet may help you:  · Lower your blood pressure and cholesterol  · Lower your risk for cancer, heart disease, heart attack, and stroke. It may also lower your risk for heart failure, kidney stones, and diabetes.  · Lose weight or keep a healthy weight  What lifestyle changes are needed?   · Add regular exercise to get the most help from this diet.  · Try to lower stress. Find ways to relax.  · Stop smoking. Avoid secondhand smoke.  · Limit alcohol intake.  What changes to diet are needed?   · Know about poor eating habits. Then, you can fix them as you work with the program.  · This diet encourages fruits and vegetables, whole grains, lean meats, healthy fats, and low-fat or fat-free dairy products.  · This diet is lower in saturated fats, trans-fats, cholesterol, added sugars, and sodium.  Who should use this diet?   This eating plan is good for the whole family. It is also good for people with high blood pressure and those at risk for high blood pressure.  What foods are good to eat?   · Grains: Try to  eat 6 to 8 servings of whole grain, high fiber foods each day. These are bread, cereals, brown rice, or pasta.  · Fruits and vegetables: Eat 4 to 5 servings each day. Try to pick many kinds and colors. Fresh or frozen are best. Look for low sodium or salt-free if you choose canned.  · Dairy: Try to eat 2 to 3 servings of fat free and low fat milk products each day.  · Lean meats, poultry, and seafood: Try to eat 6 servings or less of lean meats, poultry, and seafood each day. Try to choose more low fat or lean meats like chicken and turkey. Eat less red meat. Eat more fish instead.  · Nuts, seeds, and legumes (dry beans and peas): Try to eat 4 to 5 servings each week. Try to pick nuts such as almonds and walnuts, sunflower seeds, peanut butter, soy beans, lentils, kidney beans, and split peas.  · Fats and oils: Try to eat 2 to 3 servings of fats and oils each day. Eat good fats found in fish, nuts, and avocados. Try using olive oil or vegetable oils such as canola oil. Other good oils to try are corn, safflower, sunflower, or soybean oils. Use low-sodium and low-fat salad dressing and mayonnaise.  · Condiments: Pepper, herbs, spices, vinegar, lemon or lime juices are great for seasoning. Be careful to choose low-sodium or salt-free products if you use broths, soups, or soy sauce.  · Sweets: Try to eat less than 5 servings each week. Choose low-fat and trans fat-free desserts. These are things like fruit flavored gelatin, sorbet, jelly beans, morgan crackers, animal crackers, low-fat fig bars, and tani snaps. Eat fruit to satisfy your desire for sweets.     What foods should be limited or avoided?   · Grains: Salted breads, rolls, crackers, quick breads, self-rising flours, biscuit mixes, regular bread crumbs, instant hot cereals, commercially-prepared rice, pasta, stuffing mixes  · Fruits and vegetables: Commercially-prepared potatoes and vegetable mixes, regular canned vegetables and juices, vegetables frozen  with sauce or pickled vegetables, processed fruits with salt or sodium  · Milk: Whole milk, malted milk, chocolate milk, buttermilk, cheese, ice cream  · Meats and beans: Smoked, cured, salted, or canned fish; meats or poultry such as barnes, sausages, sardines; high-fat cuts of meat like beef, lamb, or pork; chicken with the skin on it  · Fats: Cut back on solid fats like butter, lard, and margarine. Eat less food with high saturated fat, cholesterol and total fat.  · Condiments and snacks: Salted and canned peas, beans, and olives; salted snack foods; fried foods; soda or other sweetened drinks  · Sweets: High-fat baked goods such as muffins, donuts, pastries, commercial baked goods, candy bars  · If you choose to drink alcohol, limit the amount you drink. Women should have 1 drink or less per day and men should have 2 drinks or less per day.  Helpful tips   · Avoid eating canned vegetables and processed foods. These have a lot of salt in them. Look for a low-salt or low-sodium choice.  · Try baking or broiling instead of frying food.  · Write down the foods you eat. This will help you track what you have eaten each week.  · When you go to a grocery store, have a list or a meal plan. Do not shop when you are hungry to avoid cravings for foods.  · Read food labels with care. They will show you how much is in a serving. The amount is given as a percentage of the total amount you need each day. Reading labels will help you make healthy food choices.       · Avoid fast foods.  · Talk to your doctor or dietitian to see if you need vitamin and mineral supplements to help you balance your diet.  · Talk to a dietitian for help.  Where can I learn more?   [employer]  https://www.eatright.org/health/wellness/heart-and-cardiovascular-health/dash-diet-reducing-hypertension-through-diet-and-lifestyle    [employer]Doctor.org  http://familydoctor.org/familydoctor/en/prevention-wellness/food-nutrition/weight-loss/the-dash-diet-healthy-eating-to-control-your-blood-pressure.html   Last Reviewed Date   2021-03-15  Consumer Information Use and Disclaimer   This information is not specific medical advice and does not replace information you receive from your health care provider. This is only a brief summary of general information. It does NOT include all information about conditions, illnesses, injuries, tests, procedures, treatments, therapies, discharge instructions or life-style choices that may apply to you. You must talk with your health care provider for complete information about your health and treatment options. This information should not be used to decide whether or not to accept your health care providers advice, instructions or recommendations. Only your health care provider has the knowledge and training to provide advice that is right for you.  Copyright   Copyright © 2021 [employer] and its affiliates and/or licensors. All rights reserved.

## 2022-03-18 ENCOUNTER — OFFICE VISIT (OUTPATIENT)
Dept: FAMILY MEDICINE | Facility: CLINIC | Age: 62
End: 2022-03-18
Payer: COMMERCIAL

## 2022-03-18 VITALS
SYSTOLIC BLOOD PRESSURE: 123 MMHG | OXYGEN SATURATION: 98 % | DIASTOLIC BLOOD PRESSURE: 82 MMHG | BODY MASS INDEX: 40.78 KG/M2 | WEIGHT: 221.63 LBS | HEART RATE: 113 BPM | HEIGHT: 62 IN | TEMPERATURE: 102 F

## 2022-03-18 DIAGNOSIS — J06.9 UPPER RESPIRATORY TRACT INFECTION, UNSPECIFIED TYPE: Primary | ICD-10-CM

## 2022-03-18 LAB
CTP QC/QA: YES
FLUAV AG NPH QL: NEGATIVE
FLUBV AG NPH QL: NEGATIVE
SARS-COV-2 AG RESP QL IA.RAPID: NEGATIVE

## 2022-03-18 PROCEDURE — 99214 OFFICE O/P EST MOD 30 MIN: CPT | Mod: 25,,, | Performed by: REGISTERED NURSE

## 2022-03-18 PROCEDURE — 3074F SYST BP LT 130 MM HG: CPT | Mod: ,,, | Performed by: REGISTERED NURSE

## 2022-03-18 PROCEDURE — 1159F PR MEDICATION LIST DOCUMENTED IN MEDICAL RECORD: ICD-10-PCS | Mod: ,,, | Performed by: REGISTERED NURSE

## 2022-03-18 PROCEDURE — 3008F PR BODY MASS INDEX (BMI) DOCUMENTED: ICD-10-PCS | Mod: ,,, | Performed by: REGISTERED NURSE

## 2022-03-18 PROCEDURE — 3008F BODY MASS INDEX DOCD: CPT | Mod: ,,, | Performed by: REGISTERED NURSE

## 2022-03-18 PROCEDURE — 96372 PR INJECTION,THERAP/PROPH/DIAG2ST, IM OR SUBCUT: ICD-10-PCS | Mod: ,,, | Performed by: REGISTERED NURSE

## 2022-03-18 PROCEDURE — 3079F PR MOST RECENT DIASTOLIC BLOOD PRESSURE 80-89 MM HG: ICD-10-PCS | Mod: ,,, | Performed by: REGISTERED NURSE

## 2022-03-18 PROCEDURE — 87428 SARSCOV & INF VIR A&B AG IA: CPT | Mod: QW,,, | Performed by: REGISTERED NURSE

## 2022-03-18 PROCEDURE — 3079F DIAST BP 80-89 MM HG: CPT | Mod: ,,, | Performed by: REGISTERED NURSE

## 2022-03-18 PROCEDURE — 99214 PR OFFICE/OUTPT VISIT, EST, LEVL IV, 30-39 MIN: ICD-10-PCS | Mod: 25,,, | Performed by: REGISTERED NURSE

## 2022-03-18 PROCEDURE — 3074F PR MOST RECENT SYSTOLIC BLOOD PRESSURE < 130 MM HG: ICD-10-PCS | Mod: ,,, | Performed by: REGISTERED NURSE

## 2022-03-18 PROCEDURE — 1159F MED LIST DOCD IN RCRD: CPT | Mod: ,,, | Performed by: REGISTERED NURSE

## 2022-03-18 PROCEDURE — 87428 POCT SARS-COV2 (COVID) WITH FLU ANTIGEN: ICD-10-PCS | Mod: QW,,, | Performed by: REGISTERED NURSE

## 2022-03-18 PROCEDURE — 1160F PR REVIEW ALL MEDS BY PRESCRIBER/CLIN PHARMACIST DOCUMENTED: ICD-10-PCS | Mod: ,,, | Performed by: REGISTERED NURSE

## 2022-03-18 PROCEDURE — 96372 THER/PROPH/DIAG INJ SC/IM: CPT | Mod: ,,, | Performed by: REGISTERED NURSE

## 2022-03-18 PROCEDURE — 1160F RVW MEDS BY RX/DR IN RCRD: CPT | Mod: ,,, | Performed by: REGISTERED NURSE

## 2022-03-18 RX ORDER — FLUTICASONE PROPIONATE 50 MCG
1 SPRAY, SUSPENSION (ML) NASAL DAILY
Qty: 11.1 ML | Refills: 0 | Status: SHIPPED | OUTPATIENT
Start: 2022-03-18 | End: 2022-04-17

## 2022-03-18 RX ORDER — DEXAMETHASONE SODIUM PHOSPHATE 4 MG/ML
4 INJECTION, SOLUTION INTRA-ARTICULAR; INTRALESIONAL; INTRAMUSCULAR; INTRAVENOUS; SOFT TISSUE
Status: COMPLETED | OUTPATIENT
Start: 2022-03-18 | End: 2022-03-18

## 2022-03-18 RX ORDER — AZITHROMYCIN 250 MG/1
TABLET, FILM COATED ORAL
Qty: 6 TABLET | Refills: 0 | Status: SHIPPED | OUTPATIENT
Start: 2022-03-18 | End: 2022-03-23

## 2022-03-18 RX ORDER — CEFTRIAXONE 1 G/1
1 INJECTION, POWDER, FOR SOLUTION INTRAMUSCULAR; INTRAVENOUS
Status: COMPLETED | OUTPATIENT
Start: 2022-03-18 | End: 2022-03-18

## 2022-03-18 RX ORDER — HYDROCORTISONE 25 MG/G
CREAM TOPICAL
COMMUNITY
Start: 2022-02-23

## 2022-03-18 RX ORDER — PROMETHAZINE HYDROCHLORIDE AND DEXTROMETHORPHAN HYDROBROMIDE 6.25; 15 MG/5ML; MG/5ML
5 SYRUP ORAL EVERY 8 HOURS PRN
Qty: 150 ML | Refills: 0 | Status: SHIPPED | OUTPATIENT
Start: 2022-03-18 | End: 2022-03-28

## 2022-03-18 RX ORDER — CHLORPHENIRAMINE MALEATE AND PHENYLEPHRINE HYDROCHLORIDE 4; 10 MG/1; MG/1
1 TABLET, COATED ORAL EVERY 4 HOURS PRN
Qty: 20 TABLET | Refills: 0 | Status: SHIPPED | OUTPATIENT
Start: 2022-03-18 | End: 2022-03-25

## 2022-03-18 RX ADMIN — CEFTRIAXONE 1 G: 1 INJECTION, POWDER, FOR SOLUTION INTRAMUSCULAR; INTRAVENOUS at 05:03

## 2022-03-18 RX ADMIN — DEXAMETHASONE SODIUM PHOSPHATE 4 MG: 4 INJECTION, SOLUTION INTRA-ARTICULAR; INTRALESIONAL; INTRAMUSCULAR; INTRAVENOUS; SOFT TISSUE at 05:03

## 2022-03-18 NOTE — PROGRESS NOTES
ROD Perez        PATIENT NAME: Marielle Durham  : 1960  DATE: 3/18/22  MRN: [id number]      Billing Provider: ROD Perez  Level of Service: [place] OFFICE/OUTPT VISIT, EST, LEVL IV, 30-39 MIN  Patient PCP Information     Provider PCP Type    ROD Perez General          Reason for Visit / Chief Complaint: Cough, Fever, and Nasal Congestion       Update PCP  Update Chief Complaint         History of Present Illness / Problem Focused Workflow      HPI Mrs. Sharp is a 61 y/o WF here with complaints of fever, dry cough, and nasal congestion. Sinus problems started about 3 days ago but she just started running fever today. She last took Tylenol about 3 hours ago. She denies shortness of breath but admits to chest soreness from coughing.     Review of Systems     Review of Systems   Constitutional: Positive for appetite change, chills, fatigue and fever.   HENT: Positive for nasal congestion, postnasal drip, sinus pressure/congestion, sore throat and voice change.    Respiratory: Positive for cough and chest tightness.    Cardiovascular: Negative.    Gastrointestinal: Negative.    Neurological: Negative.    Psychiatric/Behavioral: Negative.         Medical / Social / Family History     Past Medical History:   Diagnosis Date    Allergy     Arthritis     Hyperlipidemia     Hypertension     Thyroid disease        Past Surgical History:   Procedure Laterality Date     SECTION      CHOLECYSTECTOMY      HYSTERECTOMY      TUBAL LIGATION         Social History  Ms. Marielle Durham  reports that she has never smoked. She has never used smokeless tobacco. She reports that she does not drink alcohol and does not use drugs.    Family History  Ms. Marielle Durham's family history is not on file.    Medications and Allergies     Medications  No outpatient medications have been marked as taking for the 3/18/22 encounter (Office Visit) with ROD Perez.     Current  Facility-Administered Medications for the 3/18/22 encounter (Office Visit) with Luis JIMENEZROD Rios   Medication Dose Route Frequency Provider Last Rate Last Admin    [COMPLETED] cefTRIAXone injection 1 g  1 g Intramuscular 1 time in Clinic/HOD ROD Perez   1 g at 03/18/22 1719    [COMPLETED] dexamethasone injection 4 mg  4 mg Intramuscular 1 time in Clinic/HOD Luis JIMENEZCastillo ChrisROD   4 mg at 03/18/22 1720       Allergies  Review of patient's allergies indicates:   Allergen Reactions    Pentazocine lactate Rash       Physical Examination     Vitals:    03/18/22 1626   BP: 123/82   Pulse: (!) 113   Temp: (!) 101.6 °F (38.7 °C)     Physical Exam  Vitals and nursing note reviewed.   Constitutional:       Appearance: She is obese. She is ill-appearing.   HENT:      Head: Normocephalic and atraumatic.      Nose: Congestion present.      Mouth/Throat:      Mouth: Mucous membranes are moist.      Pharynx: Posterior oropharyngeal erythema present.   Cardiovascular:      Rate and Rhythm: Normal rate and regular rhythm.      Heart sounds: Normal heart sounds.   Pulmonary:      Effort: Pulmonary effort is normal.      Breath sounds: Normal breath sounds.   Musculoskeletal:         General: Normal range of motion.      Cervical back: Normal range of motion.   Skin:     General: Skin is warm and dry.   Neurological:      Mental Status: She is alert and oriented to person, place, and time.   Psychiatric:         Behavior: Behavior normal.       Assessment and Plan (including Health Maintenance)      Problem List  Smart Sets  Document Outside HM   :Plan:   Upper respiratory tract infection, unspecified type  -     POCT SARS-COV2 (COVID) with Flu Antigen  -     cefTRIAXone injection 1 g  -     dexamethasone injection 4 mg  -     azithromycin (Z-MALLORY) 250 MG tablet; Take 2 tablets by mouth on day 1; Take 1 tablet by mouth on days 2-5  Dispense: 6 tablet; Refill: 0  -     promethazine-dextromethorphan (PROMETHAZINE-DM)  6.25-15 mg/5 mL Syrp; Take 5 mLs by mouth every 8 (eight) hours as needed (cough).  Dispense: 150 mL; Refill: 0  -     chlorpheniramine-phenylephrine (ED A-HIST) 4-10 mg per tablet; Take 1 tablet by mouth every 4 (four) hours as needed for Congestion.  Dispense: 20 tablet; Refill: 0  -     fluticasone propionate (FLONASE) 50 mcg/actuation nasal spray; 1 spray (50 mcg total) by Each Nostril route once daily.  Dispense: 11.1 mL; Refill: 0       Health Maintenance Due   Topic Date Due    Hepatitis C Screening  Never done    COVID-19 Vaccine (1) Never done    HIV Screening  Never done    TETANUS VACCINE  Never done    Mammogram  Never done    Colorectal Cancer Screening  Never done    Shingles Vaccine (1 of 2) Never done    Influenza Vaccine (1) Never done       Problem List Items Addressed This Visit    None     Visit Diagnoses     Upper respiratory tract infection, unspecified type    -  Primary    Relevant Medications    cefTRIAXone injection 1 g (Completed)    dexamethasone injection 4 mg (Completed)    azithromycin (Z-MALLORY) 250 MG tablet    promethazine-dextromethorphan (PROMETHAZINE-DM) 6.25-15 mg/5 mL Syrp    chlorpheniramine-phenylephrine (ED A-HIST) 4-10 mg per tablet    fluticasone propionate (FLONASE) 50 mcg/actuation nasal spray    Other Relevant Orders    POCT SARS-COV2 (COVID) with Flu Antigen (Completed)          Health Maintenance Topics with due status: Not Due       Topic Last Completion Date    Lipid Panel 10/12/2021       Future Appointments   Date Time Provider Department Center   4/29/2022  9:00 AM ROD Perez [facility]        Patient Instructions   Increase fluid intake to stay hydrated. Take Tylenol or ibuprofen as needed for fever or pain. May alternate medications as needed. Take Ed A-Hist 1 tablet every 4-6 hours for congestion and cough. Do not take Phenergan DM and Ed A-Hist at the same time. Save Phenergan DM for nighttime as it will make your drowsy. Patient  has albuterol inhaler at home. Instructed to use every 4-6 hours as needed for cough and chest tightness. Patient to take all doses of antibiotic therapy as prescribed. Go to the ED if you have any trouble breathing. Return to clinic in 3-5 days if condition persists or does not improve.    Follow up if symptoms worsen or fail to improve.     Signature:  ROD Perez      Date of encounter: 3/18/22

## 2022-03-18 NOTE — PATIENT INSTRUCTIONS
Increase fluid intake to stay hydrated. Take Tylenol or ibuprofen as needed for fever or pain. May alternate medications as needed. Take Ed A-Hist 1 tablet every 4-6 hours for congestion and cough. Do not take Phenergan DM and Ed A-Hist at the same time. Save Phenergan DM for nighttime as it will make your drowsy. Patient has albuterol inhaler at home. Instructed to use every 4-6 hours as needed for cough and chest tightness. Patient to take all doses of antibiotic therapy as prescribed. Go to the ED if you have any trouble breathing. Return to clinic in 3-5 days if condition persists or does not improve.

## 2022-03-30 ENCOUNTER — OFFICE VISIT (OUTPATIENT)
Dept: FAMILY MEDICINE | Facility: CLINIC | Age: 62
End: 2022-03-30
Payer: COMMERCIAL

## 2022-03-30 VITALS
BODY MASS INDEX: 41.59 KG/M2 | HEIGHT: 62 IN | WEIGHT: 226 LBS | SYSTOLIC BLOOD PRESSURE: 124 MMHG | OXYGEN SATURATION: 99 % | TEMPERATURE: 99 F | DIASTOLIC BLOOD PRESSURE: 86 MMHG | HEART RATE: 75 BPM

## 2022-03-30 DIAGNOSIS — E66.9 OBESITY, UNSPECIFIED CLASSIFICATION, UNSPECIFIED OBESITY TYPE, UNSPECIFIED WHETHER SERIOUS COMORBIDITY PRESENT: ICD-10-CM

## 2022-03-30 DIAGNOSIS — J06.9 UPPER RESPIRATORY TRACT INFECTION, UNSPECIFIED TYPE: Primary | ICD-10-CM

## 2022-03-30 DIAGNOSIS — I10 PRIMARY HYPERTENSION: ICD-10-CM

## 2022-03-30 DIAGNOSIS — E78.5 HYPERLIPIDEMIA, UNSPECIFIED HYPERLIPIDEMIA TYPE: ICD-10-CM

## 2022-03-30 PROCEDURE — 3079F PR MOST RECENT DIASTOLIC BLOOD PRESSURE 80-89 MM HG: ICD-10-PCS | Mod: ,,, | Performed by: NURSE PRACTITIONER

## 2022-03-30 PROCEDURE — 1160F PR REVIEW ALL MEDS BY PRESCRIBER/CLIN PHARMACIST DOCUMENTED: ICD-10-PCS | Mod: ,,, | Performed by: NURSE PRACTITIONER

## 2022-03-30 PROCEDURE — 96372 PR INJECTION,THERAP/PROPH/DIAG2ST, IM OR SUBCUT: ICD-10-PCS | Mod: ,,, | Performed by: NURSE PRACTITIONER

## 2022-03-30 PROCEDURE — 3079F DIAST BP 80-89 MM HG: CPT | Mod: ,,, | Performed by: NURSE PRACTITIONER

## 2022-03-30 PROCEDURE — 3008F PR BODY MASS INDEX (BMI) DOCUMENTED: ICD-10-PCS | Mod: ,,, | Performed by: NURSE PRACTITIONER

## 2022-03-30 PROCEDURE — 3008F BODY MASS INDEX DOCD: CPT | Mod: ,,, | Performed by: NURSE PRACTITIONER

## 2022-03-30 PROCEDURE — 99214 OFFICE O/P EST MOD 30 MIN: CPT | Mod: 25,,, | Performed by: NURSE PRACTITIONER

## 2022-03-30 PROCEDURE — 3074F PR MOST RECENT SYSTOLIC BLOOD PRESSURE < 130 MM HG: ICD-10-PCS | Mod: ,,, | Performed by: NURSE PRACTITIONER

## 2022-03-30 PROCEDURE — 99214 PR OFFICE/OUTPT VISIT, EST, LEVL IV, 30-39 MIN: ICD-10-PCS | Mod: 25,,, | Performed by: NURSE PRACTITIONER

## 2022-03-30 PROCEDURE — 3074F SYST BP LT 130 MM HG: CPT | Mod: ,,, | Performed by: NURSE PRACTITIONER

## 2022-03-30 PROCEDURE — 1159F PR MEDICATION LIST DOCUMENTED IN MEDICAL RECORD: ICD-10-PCS | Mod: ,,, | Performed by: NURSE PRACTITIONER

## 2022-03-30 PROCEDURE — 96372 THER/PROPH/DIAG INJ SC/IM: CPT | Mod: ,,, | Performed by: NURSE PRACTITIONER

## 2022-03-30 PROCEDURE — 1160F RVW MEDS BY RX/DR IN RCRD: CPT | Mod: ,,, | Performed by: NURSE PRACTITIONER

## 2022-03-30 PROCEDURE — 1159F MED LIST DOCD IN RCRD: CPT | Mod: ,,, | Performed by: NURSE PRACTITIONER

## 2022-03-30 RX ORDER — PROMETHAZINE HYDROCHLORIDE AND DEXTROMETHORPHAN HYDROBROMIDE 6.25; 15 MG/5ML; MG/5ML
5 SYRUP ORAL EVERY 4 HOURS PRN
Qty: 180 ML | Refills: 1 | Status: SHIPPED | OUTPATIENT
Start: 2022-03-30 | End: 2022-04-09

## 2022-03-30 RX ORDER — DULOXETIN HYDROCHLORIDE 60 MG/1
CAPSULE, DELAYED RELEASE ORAL
COMMUNITY
End: 2022-09-19

## 2022-03-30 RX ORDER — DOXYCYCLINE 100 MG/1
100 CAPSULE ORAL 2 TIMES DAILY
Qty: 20 CAPSULE | Refills: 0 | Status: SHIPPED | OUTPATIENT
Start: 2022-03-30 | End: 2022-04-09

## 2022-03-30 RX ORDER — METOPROLOL TARTRATE 25 MG/1
TABLET, FILM COATED ORAL
Qty: 180 TABLET | Refills: 1 | Status: SHIPPED | OUTPATIENT
Start: 2022-03-30 | End: 2022-09-19 | Stop reason: SDUPTHER

## 2022-03-30 RX ORDER — OLMESARTAN MEDOXOMIL AND HYDROCHLOROTHIAZIDE 20/12.5 20; 12.5 MG/1; MG/1
TABLET ORAL
COMMUNITY
End: 2022-05-10 | Stop reason: CLARIF

## 2022-03-30 RX ORDER — ROSUVASTATIN CALCIUM 20 MG/1
20 TABLET, COATED ORAL NIGHTLY
Qty: 90 TABLET | Refills: 1 | Status: SHIPPED | OUTPATIENT
Start: 2022-03-30 | End: 2022-06-28

## 2022-03-30 RX ORDER — ROSUVASTATIN CALCIUM 20 MG/1
TABLET, COATED ORAL
COMMUNITY
End: 2022-09-19 | Stop reason: SDUPTHER

## 2022-03-30 RX ORDER — LEVOTHYROXINE SODIUM 112 UG/1
TABLET ORAL
COMMUNITY
End: 2022-09-19 | Stop reason: SDUPTHER

## 2022-03-30 RX ORDER — NAPROXEN SODIUM 220 MG/1
TABLET, FILM COATED ORAL
COMMUNITY
End: 2022-03-30

## 2022-03-30 RX ORDER — CEFTRIAXONE 1 G/1
1 INJECTION, POWDER, FOR SOLUTION INTRAMUSCULAR; INTRAVENOUS
Status: COMPLETED | OUTPATIENT
Start: 2022-03-30 | End: 2022-03-30

## 2022-03-30 RX ORDER — FOLIC ACID 1 MG/1
TABLET ORAL
COMMUNITY

## 2022-03-30 RX ORDER — HYDROXYCHLOROQUINE SULFATE 200 MG/1
TABLET, FILM COATED ORAL
COMMUNITY

## 2022-03-30 RX ADMIN — CEFTRIAXONE 1 G: 1 INJECTION, POWDER, FOR SOLUTION INTRAMUSCULAR; INTRAVENOUS at 12:03

## 2022-03-30 NOTE — PROGRESS NOTES
New Clinic note    Marielle Durham is a 62 y.o. female with nasal congestion,nasal drainage,croupy cough that is productive for 2 weeks got better now worse. Head hurts when she coughs.Has been gaining weight.  Chief complaint:   Chief Complaint   Patient presents with    Sinus Problem      Subjective:  HPI   Current Meds:    Current Outpatient Medications:     aspirin 81 MG Chew, 1 tablet, Disp: , Rfl:     DUEXIS 800-26.6 mg Tab, Take 1 tablet by mouth 3 (three) times daily as needed., Disp: , Rfl:     fluticasone propionate (FLONASE) 50 mcg/actuation nasal spray, 1 spray (50 mcg total) by Each Nostril route once daily., Disp: 11.1 mL, Rfl: 0    folic acid (FOLVITE) 1 MG tablet, 1 tablet, Disp: , Rfl:     furosemide (LASIX) 20 MG tablet, once daily., Disp: , Rfl:     hydrocortisone (ANUSOL-HC) 2.5 % rectal cream, SMARTSIG:Topical, Disp: , Rfl:     hydrOXYchloroQUINE (PLAQUENIL) 200 mg tablet, 2 (two) times daily., Disp: , Rfl:     levothyroxine (SYNTHROID) 112 MCG tablet, 1 tablet, Disp: , Rfl:     methotrexate 2.5 MG Tab, 8 tablets, Disp: , Rfl:     olmesartan-hydrochlorothiazide (BENICAR HCT) 20-12.5 mg per tablet, TAKE ONE TABLET BY MOUTH DAILY FOR BLOOD PRESSURE, Disp: 90 tablet, Rfl: 0    pantoprazole (PROTONIX) 40 MG tablet, Take 1 tablet (40 mg total) by mouth once daily., Disp: 30 tablet, Rfl: 11    rosuvastatin (CRESTOR) 20 MG tablet, , Disp: , Rfl:     cetirizine (ZYRTEC) 10 MG tablet, Take 1 tablet (10 mg total) by mouth once daily., Disp: 30 tablet, Rfl: 1    doxycycline (VIBRAMYCIN) 100 MG Cap, Take 1 capsule (100 mg total) by mouth 2 (two) times daily. for 10 days, Disp: 20 capsule, Rfl: 0    DULoxetine (CYMBALTA) 60 MG capsule, TAKE ONE (1) CAPSULE BY MOUTH TWICE DAILY (Patient not taking: Reported on 3/30/2022), Disp: 60 capsule, Rfl: 5    DULoxetine (CYMBALTA) 60 MG capsule, , Disp: , Rfl:     folic acid (FOLVITE) 1 MG tablet, , Disp: , Rfl:     hydrOXYchloroQUINE (PLAQUENIL)  200 mg tablet, , Disp: , Rfl:     levothyroxine (SYNTHROID) 112 MCG tablet, , Disp: , Rfl:     metoprolol succinate (TOPROL-XL) 25 MG 24 hr tablet, 1 tablet, Disp: , Rfl:     metoprolol tartrate (LOPRESSOR) 25 MG tablet, TAKE ONE (1) TABLET BY MOUTH EACH MORNING AND EACH EVENING, Disp: 180 tablet, Rfl: 1    olmesartan-hydrochlorothiazide (BENICAR HCT) 20-12.5 mg per tablet, , Disp: , Rfl:     oxybutynin (DITROPAN-XL) 10 MG 24 hr tablet, Take 1 tablet (10 mg total) by mouth once daily., Disp: 90 tablet, Rfl: 1    promethazine-dextromethorphan (PROMETHAZINE-DM) 6.25-15 mg/5 mL Syrp, Take 5 mLs by mouth every 4 (four) hours as needed (as needed for cough)., Disp: 180 mL, Rfl: 1    rosuvastatin (CRESTOR) 20 MG tablet, Take 1 tablet (20 mg total) by mouth every evening., Disp: 90 tablet, Rfl: 1    semaglutide (RYBELSUS) 7 mg tablet, Take 1 tablet (7 mg total) by mouth once daily., Disp: 30 tablet, Rfl: 3  No current facility-administered medications for this visit.   History reviewed. No pertinent family history.   Past Medical History:   Diagnosis Date    Allergy     Arthritis     Hyperlipidemia     Hypertension     Thyroid disease       Past Surgical History:   Procedure Laterality Date     SECTION      CHOLECYSTECTOMY      HYSTERECTOMY      TUBAL LIGATION        Social History     Socioeconomic History    Marital status:      Spouse name: esteban    Number of children: 2   Occupational History    Occupation: retired   Tobacco Use    Smoking status: Never Smoker    Smokeless tobacco: Never Used   Substance and Sexual Activity    Alcohol use: Never    Drug use: Never    Sexual activity: Yes     Partners: Male     Birth control/protection: None      Review of Systems   Constitutional: Positive for fever.   HENT: Positive for nasal congestion, postnasal drip, rhinorrhea and sinus pressure/congestion.    Respiratory: Positive for cough.    Endocrine:        Increased appetite      BP  "124/86 (BP Location: Left arm, Patient Position: Sitting, BP Method: X-Large (Manual))   Pulse 75   Temp 99 °F (37.2 °C)   Ht 5' 2" (1.575 m)   Wt 102.5 kg (226 lb)   SpO2 99%   BMI 41.34 kg/m²    Physical Exam  Vitals and nursing note reviewed.   Constitutional:       Appearance: Normal appearance. She is obese.   HENT:      Head: Normocephalic.      Ears:      Comments: TMS cloudy and bulging      Mouth/Throat:      Pharynx: Posterior oropharyngeal erythema present.   Cardiovascular:      Rate and Rhythm: Normal rate and regular rhythm.      Heart sounds: Normal heart sounds.   Pulmonary:      Effort: Pulmonary effort is normal.      Breath sounds: Normal breath sounds.      Comments: Croupy cough  Musculoskeletal:         General: Normal range of motion.      Cervical back: Normal range of motion.   Neurological:      General: No focal deficit present.      Mental Status: She is oriented to person, place, and time.      Deep Tendon Reflexes: Abnormal reflex:    Psychiatric:         Behavior: Behavior normal.        1. Upper respiratory tract infection, unspecified type    2. Primary hypertension    3. Hyperlipidemia, unspecified hyperlipidemia type    4. Obesity, unspecified classification, unspecified obesity type, unspecified whether serious comorbidity present       Patient Instructions   Follow with endocrinologist.     Problem List Items Addressed This Visit        Cardiac/Vascular    Hypertension    Hyperlipidemia      Other Visit Diagnoses     Upper respiratory tract infection, unspecified type    -  Primary    Relevant Medications    cefTRIAXone injection 1 g (Completed)    Obesity, unspecified classification, unspecified obesity type, unspecified whether serious comorbidity present                     "

## 2022-05-10 DIAGNOSIS — I10 PRIMARY HYPERTENSION: Primary | ICD-10-CM

## 2022-05-11 RX ORDER — OLMESARTAN MEDOXOMIL AND HYDROCHLOROTHIAZIDE 20/12.5 20; 12.5 MG/1; MG/1
1 TABLET ORAL DAILY
Qty: 90 TABLET | Refills: 0 | Status: SHIPPED | OUTPATIENT
Start: 2022-05-11 | End: 2022-09-19 | Stop reason: SDUPTHER

## 2022-05-27 ENCOUNTER — OFFICE VISIT (OUTPATIENT)
Dept: FAMILY MEDICINE | Facility: CLINIC | Age: 62
End: 2022-05-27
Payer: COMMERCIAL

## 2022-05-27 VITALS — HEART RATE: 72 BPM | DIASTOLIC BLOOD PRESSURE: 70 MMHG | SYSTOLIC BLOOD PRESSURE: 128 MMHG | OXYGEN SATURATION: 98 %

## 2022-05-27 DIAGNOSIS — M54.42 MIDLINE LOW BACK PAIN WITH BILATERAL SCIATICA, UNSPECIFIED CHRONICITY: Primary | ICD-10-CM

## 2022-05-27 DIAGNOSIS — M54.41 MIDLINE LOW BACK PAIN WITH BILATERAL SCIATICA, UNSPECIFIED CHRONICITY: Primary | ICD-10-CM

## 2022-05-27 DIAGNOSIS — M54.9 BACK PAIN, UNSPECIFIED BACK LOCATION, UNSPECIFIED BACK PAIN LATERALITY, UNSPECIFIED CHRONICITY: ICD-10-CM

## 2022-05-27 DIAGNOSIS — M51.36 DEGENERATIVE LUMBAR DISC: ICD-10-CM

## 2022-05-27 DIAGNOSIS — M43.16 ANTEROLISTHESIS OF LUMBAR SPINE: ICD-10-CM

## 2022-05-27 PROCEDURE — 99213 OFFICE O/P EST LOW 20 MIN: CPT | Mod: 25,,, | Performed by: NURSE PRACTITIONER

## 2022-05-27 PROCEDURE — 96372 PR INJECTION,THERAP/PROPH/DIAG2ST, IM OR SUBCUT: ICD-10-PCS | Mod: ,,, | Performed by: NURSE PRACTITIONER

## 2022-05-27 PROCEDURE — 96372 THER/PROPH/DIAG INJ SC/IM: CPT | Mod: ,,, | Performed by: NURSE PRACTITIONER

## 2022-05-27 PROCEDURE — 1159F PR MEDICATION LIST DOCUMENTED IN MEDICAL RECORD: ICD-10-PCS | Mod: ,,, | Performed by: NURSE PRACTITIONER

## 2022-05-27 PROCEDURE — 99213 PR OFFICE/OUTPT VISIT, EST, LEVL III, 20-29 MIN: ICD-10-PCS | Mod: 25,,, | Performed by: NURSE PRACTITIONER

## 2022-05-27 PROCEDURE — 3074F SYST BP LT 130 MM HG: CPT | Mod: ,,, | Performed by: NURSE PRACTITIONER

## 2022-05-27 PROCEDURE — 3078F DIAST BP <80 MM HG: CPT | Mod: ,,, | Performed by: NURSE PRACTITIONER

## 2022-05-27 PROCEDURE — 3078F PR MOST RECENT DIASTOLIC BLOOD PRESSURE < 80 MM HG: ICD-10-PCS | Mod: ,,, | Performed by: NURSE PRACTITIONER

## 2022-05-27 PROCEDURE — 1159F MED LIST DOCD IN RCRD: CPT | Mod: ,,, | Performed by: NURSE PRACTITIONER

## 2022-05-27 PROCEDURE — 3074F PR MOST RECENT SYSTOLIC BLOOD PRESSURE < 130 MM HG: ICD-10-PCS | Mod: ,,, | Performed by: NURSE PRACTITIONER

## 2022-05-27 RX ORDER — DEXAMETHASONE SODIUM PHOSPHATE 4 MG/ML
8 INJECTION, SOLUTION INTRA-ARTICULAR; INTRALESIONAL; INTRAMUSCULAR; INTRAVENOUS; SOFT TISSUE
Status: COMPLETED | OUTPATIENT
Start: 2022-05-27 | End: 2022-05-27

## 2022-05-27 RX ORDER — KETOROLAC TROMETHAMINE 10 MG/1
10 TABLET, FILM COATED ORAL EVERY 6 HOURS
Qty: 20 TABLET | Refills: 0 | Status: SHIPPED | OUTPATIENT
Start: 2022-05-27 | End: 2022-06-01

## 2022-05-27 RX ORDER — METHYLPREDNISOLONE 4 MG/1
TABLET ORAL
Qty: 21 EACH | Refills: 0 | Status: SHIPPED | OUTPATIENT
Start: 2022-05-27 | End: 2022-06-17

## 2022-05-27 RX ORDER — KETOROLAC TROMETHAMINE 30 MG/ML
60 INJECTION, SOLUTION INTRAMUSCULAR; INTRAVENOUS
Status: COMPLETED | OUTPATIENT
Start: 2022-05-27 | End: 2022-05-27

## 2022-05-27 RX ADMIN — DEXAMETHASONE SODIUM PHOSPHATE 8 MG: 4 INJECTION, SOLUTION INTRA-ARTICULAR; INTRALESIONAL; INTRAMUSCULAR; INTRAVENOUS; SOFT TISSUE at 12:05

## 2022-05-27 RX ADMIN — KETOROLAC TROMETHAMINE 60 MG: 30 INJECTION, SOLUTION INTRAMUSCULAR; INTRAVENOUS at 12:05

## 2022-05-27 NOTE — PROGRESS NOTES
Subjective:       Patient ID: Marielle Durham is a 62 y.o. female.    Chief Complaint: Back Pain    63 y/o female presents today for lower back pain, since Wednesday. Bent over to pick something up an when she stood back straight, began having severe pain in lower back that radiates down both legs. Denies loss of bowel or bladder control, difficulty moving, tingling or numbness.     Review of Systems   Constitutional: Negative.    Respiratory: Negative.    Cardiovascular: Negative.    Musculoskeletal: Positive for back pain and leg pain. Negative for gait problem.   Neurological: Negative.    Psychiatric/Behavioral: Negative.    All other systems reviewed and are negative.        Objective:      Physical Exam  Vitals and nursing note reviewed.   Constitutional:       Appearance: Normal appearance. She is normal weight.   HENT:      Head: Normocephalic and atraumatic.      Mouth/Throat:      Mouth: Mucous membranes are moist.      Pharynx: Oropharynx is clear.   Eyes:      Extraocular Movements: Extraocular movements intact.      Conjunctiva/sclera: Conjunctivae normal.      Pupils: Pupils are equal, round, and reactive to light.   Cardiovascular:      Rate and Rhythm: Normal rate and regular rhythm.      Pulses: Normal pulses.      Heart sounds: Normal heart sounds.   Pulmonary:      Effort: Pulmonary effort is normal.      Breath sounds: Normal breath sounds.   Abdominal:      General: Abdomen is flat. Bowel sounds are normal.      Palpations: Abdomen is soft.   Musculoskeletal:         General: Normal range of motion.      Cervical back: Normal range of motion and neck supple.   Skin:     General: Skin is warm and dry.      Capillary Refill: Capillary refill takes less than 2 seconds.   Neurological:      General: No focal deficit present.      Mental Status: She is alert and oriented to person, place, and time. Mental status is at baseline.      Sensory: Sensation is intact.      Motor: Motor function is  intact.      Gait: Gait is intact.      Deep Tendon Reflexes:      Reflex Scores:       Patellar reflexes are 2+ on the right side and 2+ on the left side.  Psychiatric:         Mood and Affect: Mood normal.         Behavior: Behavior normal.         Thought Content: Thought content normal.         Judgment: Judgment normal.         Assessment:       Problem List Items Addressed This Visit        Neuro    Degenerative lumbar disc    Relevant Orders    Ambulatory referral/consult to Neurosurgery      Other Visit Diagnoses     Midline low back pain with bilateral sciatica, unspecified chronicity    -  Primary    Relevant Medications    methylPREDNISolone (MEDROL DOSEPACK) 4 mg tablet    ketorolac (TORADOL) 10 mg tablet    Other Relevant Orders    Ambulatory referral/consult to Neurosurgery    Back pain, unspecified back location, unspecified back pain laterality, unspecified chronicity        Relevant Medications    ketorolac injection 60 mg (Completed)    dexamethasone injection 8 mg (Completed)    methylPREDNISolone (MEDROL DOSEPACK) 4 mg tablet    ketorolac (TORADOL) 10 mg tablet    Other Relevant Orders    X-Ray Lumbar 4-5 View including Bending Views (Completed)    Anterolisthesis of lumbar spine        Relevant Orders    Ambulatory referral/consult to Neurosurgery          Plan:       IM Toradol and Decadron for pain  D/C home on short course of steroids, take Tylenol or NSAIDs as needed for pain  Xray Lumbar Spine-- shows grade 1 anterolisthesis and multi level mild to moderate degenerative changes   Will place a referral for follow up with Neurosurgeon for further eval and treatment if pain/symptoms persists or worsen

## 2022-09-19 ENCOUNTER — OFFICE VISIT (OUTPATIENT)
Dept: FAMILY MEDICINE | Facility: CLINIC | Age: 62
End: 2022-09-19
Payer: COMMERCIAL

## 2022-09-19 VITALS
DIASTOLIC BLOOD PRESSURE: 66 MMHG | TEMPERATURE: 98 F | HEIGHT: 62 IN | OXYGEN SATURATION: 97 % | BODY MASS INDEX: 46.41 KG/M2 | SYSTOLIC BLOOD PRESSURE: 120 MMHG | WEIGHT: 252.19 LBS

## 2022-09-19 DIAGNOSIS — M25.562 ACUTE PAIN OF LEFT KNEE: Primary | ICD-10-CM

## 2022-09-19 DIAGNOSIS — E78.5 HYPERLIPIDEMIA, UNSPECIFIED HYPERLIPIDEMIA TYPE: ICD-10-CM

## 2022-09-19 DIAGNOSIS — N32.81 OVERACTIVE BLADDER: ICD-10-CM

## 2022-09-19 DIAGNOSIS — I10 PRIMARY HYPERTENSION: ICD-10-CM

## 2022-09-19 PROCEDURE — 3074F SYST BP LT 130 MM HG: CPT | Mod: CPTII,,, | Performed by: REGISTERED NURSE

## 2022-09-19 PROCEDURE — 96372 THER/PROPH/DIAG INJ SC/IM: CPT | Mod: ,,, | Performed by: REGISTERED NURSE

## 2022-09-19 PROCEDURE — 3078F PR MOST RECENT DIASTOLIC BLOOD PRESSURE < 80 MM HG: ICD-10-PCS | Mod: CPTII,,, | Performed by: REGISTERED NURSE

## 2022-09-19 PROCEDURE — 1159F PR MEDICATION LIST DOCUMENTED IN MEDICAL RECORD: ICD-10-PCS | Mod: CPTII,,, | Performed by: REGISTERED NURSE

## 2022-09-19 PROCEDURE — 99214 PR OFFICE/OUTPT VISIT, EST, LEVL IV, 30-39 MIN: ICD-10-PCS | Mod: 25,,, | Performed by: REGISTERED NURSE

## 2022-09-19 PROCEDURE — 1159F MED LIST DOCD IN RCRD: CPT | Mod: CPTII,,, | Performed by: REGISTERED NURSE

## 2022-09-19 PROCEDURE — 1160F PR REVIEW ALL MEDS BY PRESCRIBER/CLIN PHARMACIST DOCUMENTED: ICD-10-PCS | Mod: CPTII,,, | Performed by: REGISTERED NURSE

## 2022-09-19 PROCEDURE — 3078F DIAST BP <80 MM HG: CPT | Mod: CPTII,,, | Performed by: REGISTERED NURSE

## 2022-09-19 PROCEDURE — 99214 OFFICE O/P EST MOD 30 MIN: CPT | Mod: 25,,, | Performed by: REGISTERED NURSE

## 2022-09-19 PROCEDURE — 3074F PR MOST RECENT SYSTOLIC BLOOD PRESSURE < 130 MM HG: ICD-10-PCS | Mod: CPTII,,, | Performed by: REGISTERED NURSE

## 2022-09-19 PROCEDURE — 96372 PR INJECTION,THERAP/PROPH/DIAG2ST, IM OR SUBCUT: ICD-10-PCS | Mod: ,,, | Performed by: REGISTERED NURSE

## 2022-09-19 PROCEDURE — 1160F RVW MEDS BY RX/DR IN RCRD: CPT | Mod: CPTII,,, | Performed by: REGISTERED NURSE

## 2022-09-19 RX ORDER — OLMESARTAN MEDOXOMIL AND HYDROCHLOROTHIAZIDE 20/12.5 20; 12.5 MG/1; MG/1
1 TABLET ORAL DAILY
Qty: 90 TABLET | Refills: 1 | Status: SHIPPED | OUTPATIENT
Start: 2022-09-19 | End: 2023-10-19

## 2022-09-19 RX ORDER — PREDNISONE 2.5 MG/1
5 TABLET ORAL DAILY PRN
COMMUNITY

## 2022-09-19 RX ORDER — OXYBUTYNIN CHLORIDE 10 MG/1
10 TABLET, EXTENDED RELEASE ORAL DAILY
Qty: 90 TABLET | Refills: 1 | Status: SHIPPED | OUTPATIENT
Start: 2022-09-19 | End: 2023-07-26 | Stop reason: SDUPTHER

## 2022-09-19 RX ORDER — FUROSEMIDE 20 MG/1
20 TABLET ORAL DAILY
Qty: 90 TABLET | Refills: 1 | Status: SHIPPED | OUTPATIENT
Start: 2022-09-19 | End: 2023-03-18

## 2022-09-19 RX ORDER — ACETAMINOPHEN 500 MG
1 TABLET ORAL
COMMUNITY

## 2022-09-19 RX ORDER — METOPROLOL TARTRATE 25 MG/1
TABLET, FILM COATED ORAL
Qty: 180 TABLET | Refills: 1 | Status: SHIPPED | OUTPATIENT
Start: 2022-09-19

## 2022-09-19 RX ORDER — SULFASALAZINE 500 MG/1
TABLET ORAL
COMMUNITY
Start: 2022-07-19

## 2022-09-19 RX ORDER — KETOROLAC TROMETHAMINE 30 MG/ML
30 INJECTION, SOLUTION INTRAMUSCULAR; INTRAVENOUS
Status: COMPLETED | OUTPATIENT
Start: 2022-09-19 | End: 2022-09-19

## 2022-09-19 RX ORDER — ROSUVASTATIN CALCIUM 20 MG/1
20 TABLET, COATED ORAL NIGHTLY
Qty: 90 TABLET | Refills: 1 | Status: SHIPPED | OUTPATIENT
Start: 2022-09-19 | End: 2023-07-26

## 2022-09-19 RX ADMIN — KETOROLAC TROMETHAMINE 30 MG: 30 INJECTION, SOLUTION INTRAMUSCULAR; INTRAVENOUS at 11:09

## 2022-09-19 NOTE — PATIENT INSTRUCTIONS
Rest is essential for the healing of injured tissue. Without rest, movement and weight bearing can continue to aggravate an injury and cause increased inflammation and swelling.     Ice is useful for reducing pain and inflammation associated with an acute injury. Icing is believed to be most effective if done during the first few hours after the injury has occurred. You can apply ice for 20 minutes at a time and as frequently as every hour. Use a cold gel pack or a plastic bag filled with ice, but do not apply a bag of ice directly to the skin. Instead, wrap the bag of ice in a towel or another material to keep the ice from directly touching your skin. Often, gel packs or cold packs sold for this purpose have a cover provided. Avoid leaving an ice pack on your injury for more than 20 minutes at a time. This can damage the skin or lead to an ice burn. After you remove the ice pack, give your skin time enough to get warm before icing it again.    Compression of an injured or painful ankle, knee, or wrist helps to reduce the swelling. Elastic bandages, such as ACE wraps, are usually effective. Special boots, air casts, and splints can offer both compression and support. Be sure not to apply a compression bandage too tightly, as this can interfere with your blood circulation. If you feel throbbing, the bandage is probably wrapped too tight; take it off and put it back on more loosely.    Elevate the injured part of the body above heart level. This provides a downward path for draining fluid back to the heart, which may reduce swelling and pain. Try to elevate the entire limb six to 10 inches above the heart. You can lie down and use a pillow to help elevate the injured limb.

## 2022-09-19 NOTE — PROGRESS NOTES
ROD Perez        PATIENT NAME: Marielle Durham  : 1960  DATE: 22  MRN: [id number]      Billing Provider: ROD Perez  Level of Service: DE OFFICE/OUTPT VISIT, EST, LEVL IV, 30-39 MIN  Patient PCP Information       Provider PCP Type    ROD Perez General          Reason for Visit / Chief Complaint: Follow-up (Says she may need blood work) and Knee Pain (Left knee pain)       Update PCP  Update Chief Complaint         History of Present Illness / Problem Focused Workflow      Knee Pain   There was no injury mechanism. The pain is present in the left knee. The quality of the pain is described as aching and stabbing. The pain is at a severity of 4/10. The pain is moderate. The pain has been Intermittent since onset. Pertinent negatives include no loss of motion, loss of sensation, numbness or tingling. She reports no foreign bodies present. The symptoms are aggravated by movement and weight bearing. She has tried acetaminophen, NSAIDs, non-weight bearing, rest, ice and elevation for the symptoms. The treatment provided mild relief.     Review of Systems     Review of Systems   Constitutional:  Positive for activity change.   HENT: Negative.     Respiratory: Negative.     Cardiovascular: Negative.    Gastrointestinal: Negative.    Genitourinary: Negative.    Musculoskeletal:  Positive for arthralgias, gait problem, joint swelling, leg pain and myalgias.   Neurological:  Negative for tingling and numbness.   Psychiatric/Behavioral: Negative.        Medical / Social / Family History     Past Medical History:   Diagnosis Date    Allergy     Arthritis     Hyperlipidemia     Hypertension     Thyroid disease      Past Surgical History:   Procedure Laterality Date     SECTION      CHOLECYSTECTOMY      HYSTERECTOMY      TUBAL LIGATION       Social History  Ms. Marielle Durham  reports that she has never smoked. She has never used smokeless tobacco. She reports that she  does not drink alcohol and does not use drugs.    Family History  Ms. Marielle Durham's family history is not on file.    Medications and Allergies     Medications  Outpatient Medications Marked as Taking for the 9/19/22 encounter (Office Visit) with ROD Perez   Medication Sig Dispense Refill    aspirin 81 MG Chew 1 tablet      calcium carbonate-vitamin D3 600 mg-20 mcg (800 unit) Tab Take 1 tablet by mouth.      cetirizine (ZYRTEC) 10 MG tablet Take 1 tablet (10 mg total) by mouth once daily. 30 tablet 1    folic acid (FOLVITE) 1 MG tablet       hydrocortisone (ANUSOL-HC) 2.5 % rectal cream as needed.      hydrOXYchloroQUINE (PLAQUENIL) 200 mg tablet       levothyroxine (SYNTHROID) 125 MCG tablet Take 125 mcg by mouth before breakfast.      methotrexate 2.5 MG Tab 8 tablets      predniSONE (DELTASONE) 2.5 MG tablet Take 5 mg by mouth daily as needed.      sulfaSALAzine (AZULFIDINE) 500 mg Tab Take by mouth.      [DISCONTINUED] furosemide (LASIX) 20 MG tablet once daily.      [DISCONTINUED] metoprolol succinate (TOPROL-XL) 25 MG 24 hr tablet 1 tablet      [DISCONTINUED] metoprolol tartrate (LOPRESSOR) 25 MG tablet TAKE ONE (1) TABLET BY MOUTH EACH MORNING AND EACH EVENING 180 tablet 1    [DISCONTINUED] olmesartan-hydrochlorothiazide (BENICAR HCT) 20-12.5 mg per tablet Take 1 tablet by mouth once daily. 90 tablet 0    [DISCONTINUED] oxybutynin (DITROPAN-XL) 10 MG 24 hr tablet Take 1 tablet (10 mg total) by mouth once daily. 90 tablet 1    [DISCONTINUED] rosuvastatin (CRESTOR) 20 MG tablet        Allergies  Review of patient's allergies indicates:   Allergen Reactions    Talwin compound Other (See Comments) and Nausea And Vomiting    Pentazocine lactate Rash and Nausea And Vomiting       Physical Examination     Vitals:    09/19/22 1040   BP: 120/66   Temp: 98.4 °F (36.9 °C)     Physical Exam  Vitals and nursing note reviewed.   Constitutional:       Appearance: Normal appearance. She is well-developed  and well-groomed. She is morbidly obese.   HENT:      Head: Normocephalic and atraumatic.      Nose: Nose normal.   Cardiovascular:      Rate and Rhythm: Normal rate and regular rhythm.      Heart sounds: Normal heart sounds.   Pulmonary:      Effort: Pulmonary effort is normal.      Breath sounds: Normal breath sounds.   Musculoskeletal:      Cervical back: Normal range of motion.      Left knee: Swelling, bony tenderness and crepitus present. Decreased range of motion.   Skin:     General: Skin is warm and dry.   Neurological:      Mental Status: She is alert and oriented to person, place, and time.   Psychiatric:         Behavior: Behavior normal. Behavior is cooperative.        Assessment and Plan (including Health Maintenance)      Problem List  Smart Sets  Document Outside HM   Plan:   Acute pain of left knee  -     X-Ray Knee 1 or 2 View Left; Future; Expected date: 09/19/2022  -     ketorolac injection 30 mg    Primary hypertension  -     metoprolol tartrate (LOPRESSOR) 25 MG tablet; TAKE ONE (1) TABLET BY MOUTH EACH MORNING AND EACH EVENING  Dispense: 180 tablet; Refill: 1  -     olmesartan-hydrochlorothiazide (BENICAR HCT) 20-12.5 mg per tablet; Take 1 tablet by mouth once daily.  Dispense: 90 tablet; Refill: 1  -     furosemide (LASIX) 20 MG tablet; Take 1 tablet (20 mg total) by mouth once daily.  Dispense: 90 tablet; Refill: 1    Hyperlipidemia, unspecified hyperlipidemia type  -     rosuvastatin (CRESTOR) 20 MG tablet; Take 1 tablet (20 mg total) by mouth every evening.  Dispense: 90 tablet; Refill: 1    Overactive bladder  -     oxybutynin (DITROPAN-XL) 10 MG 24 hr tablet; Take 1 tablet (10 mg total) by mouth once daily.  Dispense: 90 tablet; Refill: 1     Health Maintenance Due   Topic Date Due    Hepatitis C Screening  Never done    COVID-19 Vaccine (1) Never done    Pneumococcal Vaccines (Age 0-64) (1 - PCV) Never done    HIV Screening  Never done    TETANUS VACCINE  Never done    Mammogram  Never  done    Shingles Vaccine (1 of 2) Never done    Colorectal Cancer Screening  Never done    Influenza Vaccine (1) Never done       Problem List Items Addressed This Visit          Cardiac/Vascular    Hypertension    Relevant Medications    metoprolol tartrate (LOPRESSOR) 25 MG tablet    olmesartan-hydrochlorothiazide (BENICAR HCT) 20-12.5 mg per tablet    furosemide (LASIX) 20 MG tablet    Hyperlipidemia    Relevant Medications    rosuvastatin (CRESTOR) 20 MG tablet     Other Visit Diagnoses       Acute pain of left knee    -  Primary    Relevant Medications    ketorolac injection 30 mg (Completed)    Other Relevant Orders    X-Ray Knee 1 or 2 View Left    Overactive bladder        Relevant Medications    oxybutynin (DITROPAN-XL) 10 MG 24 hr tablet          Health Maintenance Topics with due status: Not Due       Topic Last Completion Date    Lipid Panel 10/12/2021     Future Appointments   Date Time Provider Department Center   10/31/2022 10:30 AM ROD Perez [facility]        Patient Instructions   Rest is essential for the healing of injured tissue. Without rest, movement and weight bearing can continue to aggravate an injury and cause increased inflammation and swelling.     Ice is useful for reducing pain and inflammation associated with an acute injury. Icing is believed to be most effective if done during the first few hours after the injury has occurred. You can apply ice for 20 minutes at a time and as frequently as every hour. Use a cold gel pack or a plastic bag filled with ice, but do not apply a bag of ice directly to the skin. Instead, wrap the bag of ice in a towel or another material to keep the ice from directly touching your skin. Often, gel packs or cold packs sold for this purpose have a cover provided. Avoid leaving an ice pack on your injury for more than 20 minutes at a time. This can damage the skin or lead to an ice burn. After you remove the ice pack, give your skin  time enough to get warm before icing it again.    Compression of an injured or painful ankle, knee, or wrist helps to reduce the swelling. Elastic bandages, such as ACE wraps, are usually effective. Special boots, air casts, and splints can offer both compression and support. Be sure not to apply a compression bandage too tightly, as this can interfere with your blood circulation. If you feel throbbing, the bandage is probably wrapped too tight; take it off and put it back on more loosely.    Elevate the injured part of the body above heart level. This provides a downward path for draining fluid back to the heart, which may reduce swelling and pain. Try to elevate the entire limb six to 10 inches above the heart. You can lie down and use a pillow to help elevate the injured limb.   Follow up in about 2 weeks (around 10/3/2022), or if symptoms worsen or fail to improve.     Signature:  ROD ePrez      Date of encounter: 9/19/22

## 2022-10-31 ENCOUNTER — OFFICE VISIT (OUTPATIENT)
Dept: FAMILY MEDICINE | Facility: CLINIC | Age: 62
End: 2022-10-31
Payer: COMMERCIAL

## 2022-10-31 VITALS
HEIGHT: 62 IN | DIASTOLIC BLOOD PRESSURE: 72 MMHG | TEMPERATURE: 99 F | BODY MASS INDEX: 47.48 KG/M2 | OXYGEN SATURATION: 99 % | HEART RATE: 65 BPM | SYSTOLIC BLOOD PRESSURE: 126 MMHG | WEIGHT: 258 LBS

## 2022-10-31 DIAGNOSIS — J01.40 ACUTE NON-RECURRENT PANSINUSITIS: ICD-10-CM

## 2022-10-31 DIAGNOSIS — E78.5 HYPERLIPIDEMIA, UNSPECIFIED HYPERLIPIDEMIA TYPE: ICD-10-CM

## 2022-10-31 DIAGNOSIS — Z11.4 ENCOUNTER FOR SCREENING FOR HIV: ICD-10-CM

## 2022-10-31 DIAGNOSIS — Z11.59 ENCOUNTER FOR HEPATITIS C SCREENING TEST FOR LOW RISK PATIENT: ICD-10-CM

## 2022-10-31 DIAGNOSIS — I10 PRIMARY HYPERTENSION: Primary | ICD-10-CM

## 2022-10-31 DIAGNOSIS — E03.9 ACQUIRED HYPOTHYROIDISM: ICD-10-CM

## 2022-10-31 DIAGNOSIS — D64.9 ANEMIA, UNSPECIFIED TYPE: ICD-10-CM

## 2022-10-31 DIAGNOSIS — M25.562 ACUTE PAIN OF LEFT KNEE: ICD-10-CM

## 2022-10-31 PROCEDURE — 1160F PR REVIEW ALL MEDS BY PRESCRIBER/CLIN PHARMACIST DOCUMENTED: ICD-10-PCS | Mod: CPTII,,, | Performed by: REGISTERED NURSE

## 2022-10-31 PROCEDURE — 3008F BODY MASS INDEX DOCD: CPT | Mod: CPTII,,, | Performed by: REGISTERED NURSE

## 2022-10-31 PROCEDURE — 3074F PR MOST RECENT SYSTOLIC BLOOD PRESSURE < 130 MM HG: ICD-10-PCS | Mod: CPTII,,, | Performed by: REGISTERED NURSE

## 2022-10-31 PROCEDURE — 99213 OFFICE O/P EST LOW 20 MIN: CPT | Mod: 25,,, | Performed by: REGISTERED NURSE

## 2022-10-31 PROCEDURE — 1159F PR MEDICATION LIST DOCUMENTED IN MEDICAL RECORD: ICD-10-PCS | Mod: CPTII,,, | Performed by: REGISTERED NURSE

## 2022-10-31 PROCEDURE — 96372 PR INJECTION,THERAP/PROPH/DIAG2ST, IM OR SUBCUT: ICD-10-PCS | Mod: ,,, | Performed by: REGISTERED NURSE

## 2022-10-31 PROCEDURE — 96372 THER/PROPH/DIAG INJ SC/IM: CPT | Mod: ,,, | Performed by: REGISTERED NURSE

## 2022-10-31 PROCEDURE — 3078F DIAST BP <80 MM HG: CPT | Mod: CPTII,,, | Performed by: REGISTERED NURSE

## 2022-10-31 PROCEDURE — 99213 PR OFFICE/OUTPT VISIT, EST, LEVL III, 20-29 MIN: ICD-10-PCS | Mod: 25,,, | Performed by: REGISTERED NURSE

## 2022-10-31 PROCEDURE — 3008F PR BODY MASS INDEX (BMI) DOCUMENTED: ICD-10-PCS | Mod: CPTII,,, | Performed by: REGISTERED NURSE

## 2022-10-31 PROCEDURE — 87428 SARSCOV & INF VIR A&B AG IA: CPT | Mod: QW,,, | Performed by: REGISTERED NURSE

## 2022-10-31 PROCEDURE — 87428 POCT SARS-COV2 (COVID) WITH FLU ANTIGEN: ICD-10-PCS | Mod: QW,,, | Performed by: REGISTERED NURSE

## 2022-10-31 PROCEDURE — 1160F RVW MEDS BY RX/DR IN RCRD: CPT | Mod: CPTII,,, | Performed by: REGISTERED NURSE

## 2022-10-31 PROCEDURE — 3078F PR MOST RECENT DIASTOLIC BLOOD PRESSURE < 80 MM HG: ICD-10-PCS | Mod: CPTII,,, | Performed by: REGISTERED NURSE

## 2022-10-31 PROCEDURE — 3074F SYST BP LT 130 MM HG: CPT | Mod: CPTII,,, | Performed by: REGISTERED NURSE

## 2022-10-31 PROCEDURE — 1159F MED LIST DOCD IN RCRD: CPT | Mod: CPTII,,, | Performed by: REGISTERED NURSE

## 2022-10-31 RX ORDER — CHLORPHENIRAMINE MALEATE AND PHENYLEPHRINE HYDROCHLORIDE 4; 10 MG/1; MG/1
1 TABLET, COATED ORAL EVERY 4 HOURS PRN
Qty: 30 TABLET | Refills: 0 | Status: SHIPPED | OUTPATIENT
Start: 2022-10-31 | End: 2022-11-10

## 2022-10-31 RX ORDER — FLUTICASONE PROPIONATE 50 MCG
1 SPRAY, SUSPENSION (ML) NASAL DAILY
Qty: 11.1 ML | Refills: 0 | Status: SHIPPED | OUTPATIENT
Start: 2022-10-31 | End: 2022-11-30

## 2022-10-31 RX ORDER — KETOROLAC TROMETHAMINE 30 MG/ML
30 INJECTION, SOLUTION INTRAMUSCULAR; INTRAVENOUS
Status: COMPLETED | OUTPATIENT
Start: 2022-10-31 | End: 2022-10-31

## 2022-10-31 RX ADMIN — KETOROLAC TROMETHAMINE 30 MG: 30 INJECTION, SOLUTION INTRAMUSCULAR; INTRAVENOUS at 11:10

## 2022-10-31 NOTE — PROGRESS NOTES
ROD Perez        PATIENT NAME: Marielle Durham  : 1960  DATE: 10/31/22  MRN: [id number]      Billing Provider: ROD Perez  Level of Service: [place] OFFICE/OUTPT VISIT, EST, LEVL III, 20-29 MIN  Patient PCP Information       Provider PCP Type    ROD Perez General          Reason for Visit / Chief Complaint: Follow-up (Needs bloodwork/), Sinus Problem, and Medication Refill     Update PCP  Update Chief Complaint         History of Present Illness / Problem Focused Workflow      Sinusitis  This is a new problem. The current episode started in the past 7 days. The problem has been gradually worsening since onset. There has been no fever. Her pain is at a severity of 3/10. The pain is mild. Associated symptoms include congestion, coughing, ear pain, headaches, a hoarse voice, sinus pressure, sneezing and a sore throat. Pertinent negatives include no neck pain, shortness of breath or swollen glands. Past treatments include acetaminophen, oral decongestants, lying down and sitting up. The treatment provided mild relief.     Mrs. Durham is a 61 y/o WF here for Hypertension follow-up and medication refills. She is due for lab work and states due to recent insurance change, she has not been to follow-up with Endocrinologist because her previous specialist is no longer in network with her insurance. She reports recent onset of sinus congestion and sore throat. No fever, nausea, vomiting, or diarrhea reported.     Review of Systems     Review of Systems   Constitutional: Negative.    HENT:  Positive for nasal congestion, ear pain, hoarse voice, sinus pressure/congestion, sneezing and sore throat.    Respiratory:  Positive for cough. Negative for shortness of breath.    Gastrointestinal: Negative.    Genitourinary: Negative.    Musculoskeletal:  Positive for arthralgias, back pain and leg pain. Negative for neck pain.   Neurological:  Positive for headaches.   Psychiatric/Behavioral:   Positive for sleep disturbance.       Medical / Social / Family History     Past Medical History:   Diagnosis Date    Allergy     Arthritis     Hyperlipidemia     Hypertension     Thyroid disease      Past Surgical History:   Procedure Laterality Date     SECTION      CHOLECYSTECTOMY      HYSTERECTOMY      TUBAL LIGATION       Social History  Ms. Marielle Durham  reports that she has quit smoking. Her smoking use included cigarettes. She has been exposed to tobacco smoke. She has never used smokeless tobacco. She reports that she does not drink alcohol and does not use drugs.    Family History  Ms. Marielle Durham's family history is not on file.    Medications and Allergies     Medications  Outpatient Medications Marked as Taking for the 10/31/22 encounter (Office Visit) with ROD Perez   Medication Sig Dispense Refill    aspirin 81 MG Chew 1 tablet      calcium carbonate-vitamin D3 600 mg-20 mcg (800 unit) Tab Take 1 tablet by mouth.      folic acid (FOLVITE) 1 MG tablet       furosemide (LASIX) 20 MG tablet Take 1 tablet (20 mg total) by mouth once daily. 90 tablet 1    hydrocortisone (ANUSOL-HC) 2.5 % rectal cream as needed.      hydrOXYchloroQUINE (PLAQUENIL) 200 mg tablet       levothyroxine (SYNTHROID) 125 MCG tablet Take 125 mcg by mouth before breakfast.      methotrexate 2.5 MG Tab 8 tablets      metoprolol tartrate (LOPRESSOR) 25 MG tablet TAKE ONE (1) TABLET BY MOUTH EACH MORNING AND EACH EVENING 180 tablet 1    olmesartan-hydrochlorothiazide (BENICAR HCT) 20-12.5 mg per tablet Take 1 tablet by mouth once daily. 90 tablet 1    oxybutynin (DITROPAN-XL) 10 MG 24 hr tablet Take 1 tablet (10 mg total) by mouth once daily. 90 tablet 1    predniSONE (DELTASONE) 2.5 MG tablet Take 5 mg by mouth daily as needed.      rosuvastatin (CRESTOR) 20 MG tablet Take 1 tablet (20 mg total) by mouth every evening. 90 tablet 1    sulfaSALAzine (AZULFIDINE) 500 mg Tab Take by mouth.        Allergies  Review of patient's allergies indicates:   Allergen Reactions    Talwin compound Other (See Comments) and Nausea And Vomiting    Pentazocine lactate Rash and Nausea And Vomiting     Physical Examination     Vitals:    10/31/22 1054   BP: 126/72   Pulse: 65   Temp:      Physical Exam  Vitals and nursing note reviewed.   Constitutional:       Appearance: Normal appearance. She is obese.   HENT:      Head: Normocephalic and atraumatic.      Nose: Congestion present.      Mouth/Throat:      Mouth: Mucous membranes are moist.      Pharynx: Posterior oropharyngeal erythema present.   Eyes:      Conjunctiva/sclera: Conjunctivae normal.   Cardiovascular:      Rate and Rhythm: Normal rate and regular rhythm.      Heart sounds: Normal heart sounds.   Pulmonary:      Effort: Pulmonary effort is normal.      Breath sounds: Normal breath sounds.   Musculoskeletal:         General: Normal range of motion.      Cervical back: Normal range of motion and neck supple.      Comments: Chronic pain in knees secondary to arthritis. Obesity and poor posture cause stiffness and decreased flexibility in lumbar spine.    Skin:     General: Skin is warm and dry.   Neurological:      Mental Status: She is alert and oriented to person, place, and time.        Assessment and Plan (including Health Maintenance)      Problem List  Smart Sets  Document Outside HM   Plan:   Primary hypertension  -     Urinalysis, Reflex to Urine Culture; Future; Expected date: 10/31/2022  -     CBC Auto Differential; Future; Expected date: 10/31/2022  -     Comprehensive Metabolic Panel; Future; Expected date: 10/31/2022  -     Cancel: Microalbumin/Creatinine Ratio, Urine; Future; Expected date: 10/31/2022    Encounter for hepatitis C screening test for low risk patient  -     Hepatitis C Antibody; Future; Expected date: 10/31/2022    Encounter for screening for HIV  -     HIV 1/2 Ag/Ab (4th Gen); Future; Expected date: 10/31/2022    Acquired  hypothyroidism  -     T4; Future; Expected date: 10/31/2022  -     TSH; Future; Expected date: 10/31/2022    Anemia, unspecified type    Hyperlipidemia, unspecified hyperlipidemia type  -     Lipid Panel; Future; Expected date: 10/31/2022    Acute pain of left knee  -     ketorolac injection 30 mg    Acute non-recurrent pansinusitis  -     POCT SARS-COV2 (COVID) with Flu Antigen  -     chlorpheniramine-phenylephrine (ED A-HIST) 4-10 mg per tablet; Take 1 tablet by mouth every 4 (four) hours as needed for Congestion.  Dispense: 30 tablet; Refill: 0  -     fluticasone propionate (FLONASE) 50 mcg/actuation nasal spray; 1 spray (50 mcg total) by Each Nostril route once daily.  Dispense: 11.1 mL; Refill: 0       Health Maintenance Due   Topic Date Due    Hepatitis C Screening  Never done    COVID-19 Vaccine (1) Never done    Pneumococcal Vaccines (Age 0-64) (1 - PCV) Never done    HIV Screening  Never done    TETANUS VACCINE  Never done    Mammogram  Never done    Shingles Vaccine (1 of 2) Never done    Hemoglobin A1c (Diabetic Prevention Screening)  Never done    Colorectal Cancer Screening  Never done    Influenza Vaccine (1) Never done     Problem List Items Addressed This Visit          Cardiac/Vascular    Hypertension - Primary    Relevant Orders    Urinalysis, Reflex to Urine Culture    CBC Auto Differential    Comprehensive Metabolic Panel    Hyperlipidemia    Relevant Orders    Lipid Panel       Oncology    Anemia       Endocrine    Acquired hypothyroidism    Relevant Orders    T4    TSH     Other Visit Diagnoses       Encounter for hepatitis C screening test for low risk patient        Relevant Orders    Hepatitis C Antibody    Encounter for screening for HIV        Relevant Orders    HIV 1/2 Ag/Ab (4th Gen)    Acute pain of left knee        Acute non-recurrent pansinusitis        Relevant Orders    POCT SARS-COV2 (COVID) with Flu Antigen (Completed)          Health Maintenance Topics with due status: Not Due        Topic Last Completion Date    Lipid Panel 10/12/2021       No future appointments.     Patient Instructions   Increase fluid intake to stay hydrated. Take all doses of antibiotic therapy as prescribed. Do not stop taking when symptoms resolve, complete dosing. May take Tylenol or ibuprofen for fever or pain. Stay active, move around at least every 2 hours when awake. Go to the ED for any worsened condition or difficulty breathing. Return to clinic if condition persists.      Return for next appointment in 3-6 months, sooner if needed. Take all medications as prescribed. Do not stop or start any new medications without consulting with your provider. If you have access to blood pressure monitor at home, may check blood pressure as needed if symptoms of high or low blood pressure evident.    Follow up in about 4 months (around 2/28/2023).     Signature:  ROD Perez      Date of encounter: 10/31/22

## 2022-12-12 NOTE — PATIENT INSTRUCTIONS
Increase fluid intake to stay hydrated. Take all doses of antibiotic therapy as prescribed. Do not stop taking when symptoms resolve, complete dosing. May take Tylenol or ibuprofen for fever or pain. Stay active, move around at least every 2 hours when awake. Go to the ED for any worsened condition or difficulty breathing. Return to clinic if condition persists.      Return for next appointment in 3-6 months, sooner if needed. Take all medications as prescribed. Do not stop or start any new medications without consulting with your provider. If you have access to blood pressure monitor at home, may check blood pressure as needed if symptoms of high or low blood pressure evident.

## 2022-12-17 DIAGNOSIS — J06.9 UPPER RESPIRATORY TRACT INFECTION, UNSPECIFIED TYPE: Primary | ICD-10-CM

## 2022-12-17 RX ORDER — PROMETHAZINE HYDROCHLORIDE AND DEXTROMETHORPHAN HYDROBROMIDE 6.25; 15 MG/5ML; MG/5ML
5 SYRUP ORAL EVERY 6 HOURS PRN
Qty: 180 ML | Refills: 0 | Status: SHIPPED | OUTPATIENT
Start: 2022-12-17 | End: 2022-12-27

## 2023-07-26 ENCOUNTER — OFFICE VISIT (OUTPATIENT)
Dept: FAMILY MEDICINE | Facility: CLINIC | Age: 63
End: 2023-07-26
Payer: COMMERCIAL

## 2023-07-26 VITALS
HEIGHT: 62 IN | OXYGEN SATURATION: 97 % | WEIGHT: 272 LBS | SYSTOLIC BLOOD PRESSURE: 113 MMHG | TEMPERATURE: 98 F | BODY MASS INDEX: 50.05 KG/M2 | DIASTOLIC BLOOD PRESSURE: 71 MMHG | HEART RATE: 60 BPM

## 2023-07-26 DIAGNOSIS — U07.1 COVID: ICD-10-CM

## 2023-07-26 DIAGNOSIS — J06.9 UPPER RESPIRATORY TRACT INFECTION, UNSPECIFIED TYPE: Primary | ICD-10-CM

## 2023-07-26 DIAGNOSIS — N32.81 OVERACTIVE BLADDER: ICD-10-CM

## 2023-07-26 LAB
CTP QC/QA: YES
FLUAV AG NPH QL: NEGATIVE
FLUBV AG NPH QL: NEGATIVE
SARS-COV-2 AG RESP QL IA.RAPID: POSITIVE

## 2023-07-26 PROCEDURE — 1160F PR REVIEW ALL MEDS BY PRESCRIBER/CLIN PHARMACIST DOCUMENTED: ICD-10-PCS | Mod: CPTII,,, | Performed by: STUDENT IN AN ORGANIZED HEALTH CARE EDUCATION/TRAINING PROGRAM

## 2023-07-26 PROCEDURE — 3078F PR MOST RECENT DIASTOLIC BLOOD PRESSURE < 80 MM HG: ICD-10-PCS | Mod: CPTII,,, | Performed by: STUDENT IN AN ORGANIZED HEALTH CARE EDUCATION/TRAINING PROGRAM

## 2023-07-26 PROCEDURE — 3074F PR MOST RECENT SYSTOLIC BLOOD PRESSURE < 130 MM HG: ICD-10-PCS | Mod: CPTII,,, | Performed by: STUDENT IN AN ORGANIZED HEALTH CARE EDUCATION/TRAINING PROGRAM

## 2023-07-26 PROCEDURE — 99213 PR OFFICE/OUTPT VISIT, EST, LEVL III, 20-29 MIN: ICD-10-PCS | Mod: ,,, | Performed by: STUDENT IN AN ORGANIZED HEALTH CARE EDUCATION/TRAINING PROGRAM

## 2023-07-26 PROCEDURE — 3074F SYST BP LT 130 MM HG: CPT | Mod: CPTII,,, | Performed by: STUDENT IN AN ORGANIZED HEALTH CARE EDUCATION/TRAINING PROGRAM

## 2023-07-26 PROCEDURE — 99213 OFFICE O/P EST LOW 20 MIN: CPT | Mod: ,,, | Performed by: STUDENT IN AN ORGANIZED HEALTH CARE EDUCATION/TRAINING PROGRAM

## 2023-07-26 PROCEDURE — 3008F PR BODY MASS INDEX (BMI) DOCUMENTED: ICD-10-PCS | Mod: CPTII,,, | Performed by: STUDENT IN AN ORGANIZED HEALTH CARE EDUCATION/TRAINING PROGRAM

## 2023-07-26 PROCEDURE — 87428 SARSCOV & INF VIR A&B AG IA: CPT | Mod: QW,,, | Performed by: STUDENT IN AN ORGANIZED HEALTH CARE EDUCATION/TRAINING PROGRAM

## 2023-07-26 PROCEDURE — 1159F PR MEDICATION LIST DOCUMENTED IN MEDICAL RECORD: ICD-10-PCS | Mod: CPTII,,, | Performed by: STUDENT IN AN ORGANIZED HEALTH CARE EDUCATION/TRAINING PROGRAM

## 2023-07-26 PROCEDURE — 3078F DIAST BP <80 MM HG: CPT | Mod: CPTII,,, | Performed by: STUDENT IN AN ORGANIZED HEALTH CARE EDUCATION/TRAINING PROGRAM

## 2023-07-26 PROCEDURE — 3008F BODY MASS INDEX DOCD: CPT | Mod: CPTII,,, | Performed by: STUDENT IN AN ORGANIZED HEALTH CARE EDUCATION/TRAINING PROGRAM

## 2023-07-26 PROCEDURE — 1159F MED LIST DOCD IN RCRD: CPT | Mod: CPTII,,, | Performed by: STUDENT IN AN ORGANIZED HEALTH CARE EDUCATION/TRAINING PROGRAM

## 2023-07-26 PROCEDURE — 87428 POCT SARS-COV2 (COVID) WITH FLU ANTIGEN: ICD-10-PCS | Mod: QW,,, | Performed by: STUDENT IN AN ORGANIZED HEALTH CARE EDUCATION/TRAINING PROGRAM

## 2023-07-26 PROCEDURE — 1160F RVW MEDS BY RX/DR IN RCRD: CPT | Mod: CPTII,,, | Performed by: STUDENT IN AN ORGANIZED HEALTH CARE EDUCATION/TRAINING PROGRAM

## 2023-07-26 RX ORDER — OXYBUTYNIN CHLORIDE 10 MG/1
10 TABLET, EXTENDED RELEASE ORAL DAILY
Qty: 90 TABLET | Refills: 1 | Status: SHIPPED | OUTPATIENT
Start: 2023-07-26 | End: 2024-01-22

## 2023-07-26 RX ORDER — GUAIFENESIN AND DEXTROMETHORPHAN HYDROBROMIDE 1200; 60 MG/1; MG/1
1 TABLET, EXTENDED RELEASE ORAL 2 TIMES DAILY PRN
Qty: 20 TABLET | Refills: 0 | Status: SHIPPED | OUTPATIENT
Start: 2023-07-26 | End: 2023-08-05

## 2023-07-26 RX ORDER — OXYMETAZOLINE HYDROCHLORIDE 0.05 G/100ML
2 SPRAY, METERED NASAL 2 TIMES DAILY PRN
Qty: 14.7 ML | Refills: 0 | Status: SHIPPED | OUTPATIENT
Start: 2023-07-26 | End: 2023-07-29

## 2023-07-26 RX ORDER — NIRMATRELVIR AND RITONAVIR 300-100 MG
KIT ORAL
Qty: 30 TABLET | Refills: 0 | Status: SHIPPED | OUTPATIENT
Start: 2023-07-26 | End: 2023-07-31

## 2023-07-26 RX ORDER — VITAMIN A 3000 MCG
2 CAPSULE ORAL
Qty: 60 ML | Refills: 12 | Status: SHIPPED | OUTPATIENT
Start: 2023-07-26

## 2023-07-26 NOTE — PATIENT INSTRUCTIONS
Do not take the rosuvastatin while taking paxlovid      Current CDC COVID-related Guidelines:     If You Test Positive for COVID-19 (Isolate) -- applies to everyone, regardless of vaccination status.  - Stay home for 5 days.  - If you have no symptoms or your symptoms are resolving after 5 days, you can leave your house.  - Continue to wear a mask around others for 5 additional days.  - If you have a fever, continue to stay home until your fever resolves.       Retesting:  After a patient tests positive, we do not recommend retesting because PCRs stay positive for up to 60 days. If required by outside organization to have a negative test, then patients are not eligible to retest for 15 days after a positive PCR.     Vaccine Guidelines:  If a person tests positive, CDC recommends they receive the COVID vaccine (including a booster) ASAP as soon as they complete their quarantine. As long as they are fever free, it is safe and effective to receive any vaccine.  If a person receives monoclonal antibodies, they are not eligible to receive a COVID vaccine until 90 days after the infusion. They are able to receive all other (non-COVID) vaccines as usual.

## 2023-07-26 NOTE — PROGRESS NOTES
"Subjective     Patient ID: Marielle Durham is a 63 y.o. female.    Chief Complaint: Nasal Congestion, Cough, and Medication Refill    HPI    63-year-old woman with the medical problems listed below who comes to clinic  with URI symptoms that started on Saturday. Mild/moderate. Ambulatory with no severe symptoms. She does have a history of RA on MTX (no plaquenil?) but is not currently taking prednisone.       Objective   /71   Pulse 60   Temp 98 °F (36.7 °C) (Oral)   Ht 5' 2" (1.575 m)   Wt 123.4 kg (272 lb)   SpO2 97%   BMI 49.75 kg/m²     Physical Exam    Gen: well-groomed, well-dressed. No apparent distress  HEENT:  NCAT, symmetric  Pulm: normal work of breathing, no accessory muscle use; speaking complete sentences without having to stop  Neuro: CN II-XII grossly normal   Psych: pleasant affect, congruent mood. Linear thought; good eye contact  SKIN: no rashes present on face, neck, hands       Assessment and Plan     Problem List Items Addressed This Visit    None  Visit Diagnoses       Upper respiratory tract infection, unspecified type    -  Primary    Relevant Orders    POCT SARS-COV2 (COVID) with Flu Antigen (Completed)    Overactive bladder        Relevant Medications    oxybutynin (DITROPAN-XL) 10 MG 24 hr tablet    COVID        Relevant Medications    nirmatrelvir-ritonavir (PAXLOVID) 300 mg (150 mg x 2)-100 mg copackaged tablets (EUA)            Higher risk for complication given her medical comorbidities. Will start paxlovid; isolate as per instructions. no indication for stress dose steroid at this time. Hold stating while taking. Notify us  if symptoms worsen or fail to improve, ED for severe symptoms.     "

## 2023-10-19 DIAGNOSIS — I10 PRIMARY HYPERTENSION: ICD-10-CM

## 2023-10-19 RX ORDER — OLMESARTAN MEDOXOMIL AND HYDROCHLOROTHIAZIDE 20/12.5 20; 12.5 MG/1; MG/1
1 TABLET ORAL
Qty: 90 TABLET | Refills: 1 | Status: SHIPPED | OUTPATIENT
Start: 2023-10-19

## 2023-10-23 ENCOUNTER — OFFICE VISIT (OUTPATIENT)
Dept: FAMILY MEDICINE | Facility: CLINIC | Age: 63
End: 2023-10-23
Payer: COMMERCIAL

## 2023-10-23 VITALS
DIASTOLIC BLOOD PRESSURE: 60 MMHG | OXYGEN SATURATION: 97 % | BODY MASS INDEX: 50.86 KG/M2 | TEMPERATURE: 99 F | HEART RATE: 67 BPM | SYSTOLIC BLOOD PRESSURE: 118 MMHG | HEIGHT: 62 IN | WEIGHT: 276.38 LBS

## 2023-10-23 DIAGNOSIS — I10 PRIMARY HYPERTENSION: ICD-10-CM

## 2023-10-23 DIAGNOSIS — J40 BRONCHITIS: Primary | ICD-10-CM

## 2023-10-23 PROBLEM — G89.29 BILATERAL CHRONIC KNEE PAIN: Status: ACTIVE | Noted: 2023-09-26

## 2023-10-23 PROBLEM — M05.79 RHEUMATOID ARTHRITIS INVOLVING MULTIPLE SITES WITH POSITIVE RHEUMATOID FACTOR: Status: ACTIVE | Noted: 2023-09-26

## 2023-10-23 PROBLEM — M25.561 BILATERAL CHRONIC KNEE PAIN: Status: ACTIVE | Noted: 2023-09-26

## 2023-10-23 PROBLEM — M25.562 BILATERAL CHRONIC KNEE PAIN: Status: ACTIVE | Noted: 2023-09-26

## 2023-10-23 PROCEDURE — 96372 PR INJECTION,THERAP/PROPH/DIAG2ST, IM OR SUBCUT: ICD-10-PCS | Mod: ,,, | Performed by: REGISTERED NURSE

## 2023-10-23 PROCEDURE — 3074F PR MOST RECENT SYSTOLIC BLOOD PRESSURE < 130 MM HG: ICD-10-PCS | Mod: CPTII,,, | Performed by: REGISTERED NURSE

## 2023-10-23 PROCEDURE — 3074F SYST BP LT 130 MM HG: CPT | Mod: CPTII,,, | Performed by: REGISTERED NURSE

## 2023-10-23 PROCEDURE — 87804 INFLUENZA ASSAY W/OPTIC: CPT | Mod: QW,,, | Performed by: REGISTERED NURSE

## 2023-10-23 PROCEDURE — 1160F RVW MEDS BY RX/DR IN RCRD: CPT | Mod: CPTII,,, | Performed by: REGISTERED NURSE

## 2023-10-23 PROCEDURE — 3008F BODY MASS INDEX DOCD: CPT | Mod: CPTII,,, | Performed by: REGISTERED NURSE

## 2023-10-23 PROCEDURE — 99213 OFFICE O/P EST LOW 20 MIN: CPT | Mod: 25,,, | Performed by: REGISTERED NURSE

## 2023-10-23 PROCEDURE — 87426 SARSCOV CORONAVIRUS AG IA: CPT | Mod: QW,,, | Performed by: REGISTERED NURSE

## 2023-10-23 PROCEDURE — 3078F DIAST BP <80 MM HG: CPT | Mod: CPTII,,, | Performed by: REGISTERED NURSE

## 2023-10-23 PROCEDURE — 1160F PR REVIEW ALL MEDS BY PRESCRIBER/CLIN PHARMACIST DOCUMENTED: ICD-10-PCS | Mod: CPTII,,, | Performed by: REGISTERED NURSE

## 2023-10-23 PROCEDURE — 96372 THER/PROPH/DIAG INJ SC/IM: CPT | Mod: ,,, | Performed by: REGISTERED NURSE

## 2023-10-23 PROCEDURE — 99213 PR OFFICE/OUTPT VISIT, EST, LEVL III, 20-29 MIN: ICD-10-PCS | Mod: 25,,, | Performed by: REGISTERED NURSE

## 2023-10-23 PROCEDURE — 1159F PR MEDICATION LIST DOCUMENTED IN MEDICAL RECORD: ICD-10-PCS | Mod: CPTII,,, | Performed by: REGISTERED NURSE

## 2023-10-23 PROCEDURE — 1159F MED LIST DOCD IN RCRD: CPT | Mod: CPTII,,, | Performed by: REGISTERED NURSE

## 2023-10-23 PROCEDURE — 3008F PR BODY MASS INDEX (BMI) DOCUMENTED: ICD-10-PCS | Mod: CPTII,,, | Performed by: REGISTERED NURSE

## 2023-10-23 PROCEDURE — 3078F PR MOST RECENT DIASTOLIC BLOOD PRESSURE < 80 MM HG: ICD-10-PCS | Mod: CPTII,,, | Performed by: REGISTERED NURSE

## 2023-10-23 PROCEDURE — 87804 POCT INFLUENZA A/B: ICD-10-PCS | Mod: QW,,, | Performed by: REGISTERED NURSE

## 2023-10-23 PROCEDURE — 87426 SARS CORONAVIRUS 2 ANTIGEN POCT: ICD-10-PCS | Mod: QW,,, | Performed by: REGISTERED NURSE

## 2023-10-23 RX ORDER — ALBUTEROL SULFATE 90 UG/1
2 AEROSOL, METERED RESPIRATORY (INHALATION) EVERY 6 HOURS PRN
Qty: 18 G | Refills: 1 | Status: SHIPPED | OUTPATIENT
Start: 2023-10-23 | End: 2024-10-22

## 2023-10-23 RX ORDER — CEFTRIAXONE 1 G/1
1 INJECTION, POWDER, FOR SOLUTION INTRAMUSCULAR; INTRAVENOUS
Status: COMPLETED | OUTPATIENT
Start: 2023-10-23 | End: 2023-10-23

## 2023-10-23 RX ORDER — AZELASTINE 1 MG/ML
1 SPRAY, METERED NASAL 2 TIMES DAILY
Qty: 30 ML | Refills: 0 | Status: SHIPPED | OUTPATIENT
Start: 2023-10-23 | End: 2024-01-21

## 2023-10-23 RX ORDER — CHLORPHENIRAMINE MALEATE AND PHENYLEPHRINE HYDROCHLORIDE 4; 10 MG/1; MG/1
1 TABLET, COATED ORAL EVERY 6 HOURS PRN
Qty: 30 TABLET | Refills: 0 | Status: SHIPPED | OUTPATIENT
Start: 2023-10-23 | End: 2023-11-02

## 2023-10-23 RX ORDER — CEPHALEXIN 500 MG/1
1000 CAPSULE ORAL 2 TIMES DAILY WITH MEALS
Qty: 28 CAPSULE | Refills: 0 | Status: SHIPPED | OUTPATIENT
Start: 2023-10-24 | End: 2023-10-31

## 2023-10-23 RX ORDER — PROMETHAZINE HYDROCHLORIDE AND DEXTROMETHORPHAN HYDROBROMIDE 6.25; 15 MG/5ML; MG/5ML
5 SYRUP ORAL EVERY 8 HOURS PRN
Qty: 180 ML | Refills: 0 | Status: SHIPPED | OUTPATIENT
Start: 2023-10-23 | End: 2023-11-02

## 2023-10-23 RX ORDER — DEXAMETHASONE SODIUM PHOSPHATE 4 MG/ML
6 INJECTION, SOLUTION INTRA-ARTICULAR; INTRALESIONAL; INTRAMUSCULAR; INTRAVENOUS; SOFT TISSUE
Status: COMPLETED | OUTPATIENT
Start: 2023-10-23 | End: 2023-10-23

## 2023-10-23 RX ADMIN — DEXAMETHASONE SODIUM PHOSPHATE 6 MG: 4 INJECTION, SOLUTION INTRA-ARTICULAR; INTRALESIONAL; INTRAMUSCULAR; INTRAVENOUS; SOFT TISSUE at 02:10

## 2023-10-23 RX ADMIN — CEFTRIAXONE 1 G: 1 INJECTION, POWDER, FOR SOLUTION INTRAMUSCULAR; INTRAVENOUS at 02:10

## 2023-10-23 NOTE — PROGRESS NOTES
ROD Perez        PATIENT NAME: Marielle Durham  : 1960  DATE: 10/23/23  MRN: [id number]      Billing Provider: ROD Perez  Level of Service: ID OFFICE/OUTPT VISIT, EST, LEV III, 20-29 MIN  Patient PCP Information       Provider PCP Type    ROD Perez General          Reason for Visit / Chief Complaint: Cough, Nasal Congestion, and Hoarse (Symptoms started Saturday and got worse on .)     Update PCP  Update Chief Complaint         History of Present Illness / Problem Focused Workflow      Cough  This is a new problem. The current episode started in the past 7 days. The problem has been gradually worsening. The problem occurs every few minutes. The cough is Productive of sputum, productive of purulent sputum and productive of brown sputum. Associated symptoms include chills, ear congestion, headaches, myalgias, nasal congestion, postnasal drip, rhinorrhea, shortness of breath and wheezing. Pertinent negatives include no chest pain, ear pain or fever. The symptoms are aggravated by lying down. She has tried OTC cough suppressant, OTC inhaler, rest, cool air and body position changes for the symptoms. The treatment provided mild relief. Her past medical history is significant for bronchitis, environmental allergies and pneumonia. There is no history of asthma, bronchiectasis, COPD or emphysema.     Review of Systems     Review of Systems   Constitutional:  Positive for appetite change, chills and fatigue. Negative for fever.   HENT:  Positive for postnasal drip and rhinorrhea. Negative for ear pain.    Respiratory:  Positive for cough, shortness of breath and wheezing.    Cardiovascular:  Negative for chest pain and leg swelling.   Gastrointestinal: Negative.  Negative for nausea and vomiting.   Genitourinary: Negative.    Musculoskeletal:  Positive for arthralgias and myalgias.   Allergic/Immunologic: Positive for environmental allergies.   Neurological:  Positive for  headaches.   Psychiatric/Behavioral:  Positive for sleep disturbance.       Medical / Social / Family History     Past Medical History:   Diagnosis Date    Allergy     Arthritis     Hyperlipidemia     Hypertension     Thyroid disease        Past Surgical History:   Procedure Laterality Date     SECTION      CHOLECYSTECTOMY      HYSTERECTOMY      TUBAL LIGATION       Social History  Ms. Mareille Durham  reports that she has quit smoking. Her smoking use included cigarettes. She has been exposed to tobacco smoke. She has never used smokeless tobacco. She reports that she does not drink alcohol and does not use drugs.    Family History  Ms. Marielle Durham's family history is not on file.    Medications and Allergies     Medications  Outpatient Medications Marked as Taking for the 10/23/23 encounter (Office Visit) with Luis Perez FNP   Medication Sig Dispense Refill    aspirin 81 MG Chew 1 tablet      calcium carbonate-vitamin D3 600 mg-20 mcg (800 unit) Tab Take 1 tablet by mouth.      folic acid (FOLVITE) 1 MG tablet       hydrocortisone (ANUSOL-HC) 2.5 % rectal cream as needed.      hydrOXYchloroQUINE (PLAQUENIL) 200 mg tablet       levothyroxine (SYNTHROID) 125 MCG tablet Take 125 mcg by mouth before breakfast.      methotrexate 2.5 MG Tab 8 tablets      metoprolol tartrate (LOPRESSOR) 25 MG tablet TAKE ONE (1) TABLET BY MOUTH EACH MORNING AND EACH EVENING 180 tablet 1    olmesartan-hydrochlorothiazide (BENICAR HCT) 20-12.5 mg per tablet TAKE (1) TABLET DAILY. 90 tablet 1    oxybutynin (DITROPAN-XL) 10 MG 24 hr tablet Take 1 tablet (10 mg total) by mouth once daily. 90 tablet 1    predniSONE (DELTASONE) 2.5 MG tablet Take 5 mg by mouth daily as needed.      sodium chloride (SALINE NASAL) 0.65 % nasal spray 2 sprays by Nasal route as needed for Congestion. 60 mL 12    sulfaSALAzine (AZULFIDINE) 500 mg Tab Take by mouth.         Allergies  Review of patient's allergies indicates:   Allergen  Reactions    Talwin compound Other (See Comments) and Nausea And Vomiting    Pentazocine lactate Rash and Nausea And Vomiting     Physical Examination     Vitals:    10/23/23 1319   BP: 118/60   Pulse: 67   Temp: 98.7 °F (37.1 °C)     Physical Exam  Vitals and nursing note reviewed.   Constitutional:       Appearance: She is well-developed. She is morbidly obese. She is ill-appearing.   HENT:      Head: Normocephalic and atraumatic.   Cardiovascular:      Rate and Rhythm: Normal rate and regular rhythm.      Heart sounds: Normal heart sounds.   Pulmonary:      Effort: Pulmonary effort is normal.      Breath sounds: Wheezing present.   Abdominal:      General: Bowel sounds are normal.   Musculoskeletal:         General: Normal range of motion.      Cervical back: Normal range of motion.   Skin:     General: Skin is warm and dry.   Neurological:      Mental Status: She is alert and oriented to person, place, and time.        Assessment and Plan (including Health Maintenance)      Problem List  Smart Sets  Document Outside HM   Plan:   Bronchitis  -     SARS Coronavirus 2 Antigen, POCT  -     POCT Influenza A/B Rapid Antigen  -     cefTRIAXone injection 1 g  -     dexAMETHasone injection 6 mg  -     albuterol (VENTOLIN HFA) 90 mcg/actuation inhaler; Inhale 2 puffs into the lungs every 6 (six) hours as needed for Wheezing. Rescue  Dispense: 18 g; Refill: 1  -     cephALEXin (KEFLEX) 500 MG capsule; Take 2 capsules (1,000 mg total) by mouth 2 (two) times daily with meals. for 7 days  Dispense: 28 capsule; Refill: 0  -     azelastine (ASTELIN) 137 mcg (0.1 %) nasal spray; 1 spray (137 mcg total) by Nasal route 2 (two) times daily.  Dispense: 30 mL; Refill: 0  -     chlorpheniramine-phenylephrine (ED A-HIST) 4-10 mg per tablet; Take 1 tablet by mouth every 6 (six) hours as needed for Congestion or Allergies.  Dispense: 30 tablet; Refill: 0  -     promethazine-dextromethorphan (PROMETHAZINE-DM) 6.25-15 mg/5 mL Syrp; Take 5  mLs by mouth every 8 (eight) hours as needed (cough).  Dispense: 180 mL; Refill: 0    Primary hypertension      Health Maintenance Due   Topic Date Due    Hepatitis C Screening  Never done    COVID-19 Vaccine (1) Never done    HIV Screening  Never done    TETANUS VACCINE  Never done    Mammogram  Never done    Shingles Vaccine (1 of 2) Never done    Hemoglobin A1c (Diabetic Prevention Screening)  Never done    Colorectal Cancer Screening  Never done    Influenza Vaccine (1) Never done     Problem List Items Addressed This Visit          Cardiac/Vascular    Hypertension     Other Visit Diagnoses       Bronchitis    -  Primary    Relevant Medications    cefTRIAXone injection 1 g (Completed)    dexAMETHasone injection 6 mg (Completed)    albuterol (VENTOLIN HFA) 90 mcg/actuation inhaler    cephALEXin (KEFLEX) 500 MG capsule    azelastine (ASTELIN) 137 mcg (0.1 %) nasal spray    chlorpheniramine-phenylephrine (ED A-HIST) 4-10 mg per tablet    promethazine-dextromethorphan (PROMETHAZINE-DM) 6.25-15 mg/5 mL Syrp    Other Relevant Orders    SARS Coronavirus 2 Antigen, POCT (Completed)    POCT Influenza A/B Rapid Antigen (Completed)          Health Maintenance Topics with due status: Not Due       Topic Last Completion Date    Lipid Panel 10/12/2021     No future appointments.     Patient Instructions   Increase fluid intake to stay hydrated. Take all doses of antibiotic therapy as prescribed. Do not stop taking when symptoms resolve, complete dosing. May take Tylenol or ibuprofen for fever or pain. Stay active, move around at least every 2 hours when awake. Go to the ED for any worsened condition or difficulty breathing. Return to clinic if condition persists.    Follow up if symptoms worsen or fail to improve.     Signature:  ROD Perez      Date of encounter: 10/23/23

## 2023-10-23 NOTE — PATIENT INSTRUCTIONS
Increase fluid intake to stay hydrated. Take all doses of antibiotic therapy as prescribed. Do not stop taking when symptoms resolve, complete dosing. May take Tylenol or ibuprofen for fever or pain. Stay active, move around at least every 2 hours when awake. Go to the ED for any worsened condition or difficulty breathing. Return to clinic if condition persists.    
Detail Level: Zone

## 2023-10-26 ENCOUNTER — CLINICAL SUPPORT (OUTPATIENT)
Dept: FAMILY MEDICINE | Facility: CLINIC | Age: 63
End: 2023-10-26
Payer: COMMERCIAL

## 2023-10-26 DIAGNOSIS — J40 BRONCHITIS: Primary | ICD-10-CM

## 2023-10-26 PROCEDURE — 96372 THER/PROPH/DIAG INJ SC/IM: CPT | Mod: ,,, | Performed by: REGISTERED NURSE

## 2023-10-26 PROCEDURE — 96372 PR INJECTION,THERAP/PROPH/DIAG2ST, IM OR SUBCUT: ICD-10-PCS | Mod: ,,, | Performed by: REGISTERED NURSE

## 2023-10-26 RX ORDER — CEFTRIAXONE 1 G/1
1 INJECTION, POWDER, FOR SOLUTION INTRAMUSCULAR; INTRAVENOUS
Status: COMPLETED | OUTPATIENT
Start: 2023-10-26 | End: 2023-10-26

## 2023-10-26 RX ADMIN — CEFTRIAXONE 1 G: 1 INJECTION, POWDER, FOR SOLUTION INTRAMUSCULAR; INTRAVENOUS at 11:10
